# Patient Record
Sex: MALE | Race: WHITE | NOT HISPANIC OR LATINO | Employment: FULL TIME | URBAN - METROPOLITAN AREA
[De-identification: names, ages, dates, MRNs, and addresses within clinical notes are randomized per-mention and may not be internally consistent; named-entity substitution may affect disease eponyms.]

---

## 2018-01-22 ENCOUNTER — HOSPITAL ENCOUNTER (EMERGENCY)
Facility: CLINIC | Age: 43
Discharge: HOME | End: 2018-01-22
Attending: EMERGENCY MEDICINE | Admitting: EMERGENCY MEDICINE
Payer: COMMERCIAL

## 2018-01-22 VITALS
BODY MASS INDEX: 36.7 KG/M2 | RESPIRATION RATE: 18 BRPM | HEART RATE: 85 BPM | DIASTOLIC BLOOD PRESSURE: 90 MMHG | SYSTOLIC BLOOD PRESSURE: 165 MMHG | TEMPERATURE: 98 F | OXYGEN SATURATION: 98 % | WEIGHT: 265 LBS

## 2018-01-22 DIAGNOSIS — K64.4 EXTERNAL HEMORRHOIDS: ICD-10-CM

## 2018-01-22 LAB — HGB BLD-MCNC: 13.9 G/DL (ref 13.3–17.7)

## 2018-01-22 PROCEDURE — 99283 EMERGENCY DEPT VISIT LOW MDM: CPT | Performed by: EMERGENCY MEDICINE

## 2018-01-22 PROCEDURE — 99282 EMERGENCY DEPT VISIT SF MDM: CPT | Mod: Z6 | Performed by: EMERGENCY MEDICINE

## 2018-01-22 PROCEDURE — 85018 HEMOGLOBIN: CPT | Performed by: EMERGENCY MEDICINE

## 2018-01-22 ASSESSMENT — ENCOUNTER SYMPTOMS
FEVER: 0
ABDOMINAL PAIN: 1
ANAL BLEEDING: 1
DIARRHEA: 0

## 2018-01-22 NOTE — ED AVS SNAPSHOT
[facility], [place], Emergency Department    [address] [place] [id number]    Phone:  [phone]                                       Jesus Grady   MRN: [id number]    Department:  [facility], Emergency Department   Date of Visit:  1/22/2018           After Visit Summary Signature Page     I have received my discharge instructions, and my questions have been answered. I have discussed any challenges I see with this plan with the nurse or doctor.    ..........................................................................................................................................  Patient/Patient Representative Signature      ..........................................................................................................................................  Patient Representative Print Name and Relationship to Patient    ..................................................               ................................................  Date                                            Time    ..........................................................................................................................................  Reviewed by Signature/Title    ...................................................              ..............................................  Date                                                            Time

## 2018-01-22 NOTE — ED AVS SNAPSHOT
[facility], Emergency Department    [address] [facility] [address]    Phone:  [phone]                                       Jesus Grady   MRN: [id number]    Department:  [facility], Emergency Department   Date of Visit:  1/22/2018           Patient Information     Date Of Birth          1975        Your diagnoses for this visit were:     External hemorrhoids        You were seen by Koby Kenney MD.        Discharge Instructions       Please make an appointment to follow up with Your Primary Care Provider.    Return to the emergency department for any problems.    Discharge References/Attachments     HEMORRHOIDS (ENGLISH)      24 Hour Appointment Hotline       To make an appointment at any [facility] clinic, call [phone] ([phone]). If you don't have a family doctor or clinic, we will help you find one. [facility] clinics are conveniently located to serve the needs of you and your family.             Review of your medicines      Our records show that you are taking the medicines listed below. If these are incorrect, please call your family doctor or clinic.        Dose / Directions Last dose taken    Multi-vitamin Tabs tablet   Dose:  1 tablet   Quantity:  100 tablet        Take 1 tablet by mouth daily.   Refills:  3                Procedures and tests performed during your visit     Hemoglobin      Orders Needing Specimen Collection     None      Pending Results     No orders found from 1/20/2018 to 1/23/2018.            Pending Culture Results     No orders found from 1/20/2018 to 1/23/2018.            Pending Results Instructions     If you had any lab results that were not finalized at the time of your Discharge, you can call the ED Lab Result RN at [phone]. You will be contacted by this team for any positive Lab results or changes in treatment. The nurses are available 7 days a week from 10A to 6:30P.  You can leave a message 24 hours per day and they will  "return your call.        Thank you for choosing [facility]       Thank you for choosing [facility] for your care. Our goal is always to provide you with excellent care. Hearing back from our patients is one way we can continue to improve our services. Please take a few minutes to complete the written survey that you may receive in the mail after you visit with us. Thank you!        The [employer] Information     [employer] lets you send messages to your doctor, view your test results, renew your prescriptions, schedule appointments and more. To sign up, go to [email or web address] . Click on \"Log in\" on the left side of the screen, which will take you to the Welcome page. Then click on \"Sign up Now\" on the right side of the page.     You will be asked to enter the access code listed below, as well as some personal information. Please follow the directions to create your username and password.     Your access code is: [email or web address]  Expires: 2018  9:13 PM     Your access code will  in 90 days. If you need help or a new code, please call your [facility] clinic or [phone].        Care EveryWhere ID     This is your Care EveryWhere ID. This could be used by other organizations to access your [facility] medical records  [id number]        Equal Access to Services     TONY LAN : Quinn beckwitho Solibertad, waaxda luqadaha, qaybta kaalmada adeegyada, jose a avila. So [facility] [phone].    ATENCIÓN: Si habla español, tiene a sal disposición servicios gratuitos de asistencia lingüística. Llame al [phone].    We comply with applicable federal civil rights laws and [place] laws. We do not discriminate on the basis of race, color, national origin, age, disability, sex, sexual orientation, or gender identity.            After Visit Summary       This is your record. Keep this with you and show to your community pharmacist(s) and doctor(s) at your next visit.                  "

## 2018-01-23 NOTE — ED PROVIDER NOTES
"    [place] EMERGENCY DEPARTMENT ([facility])  1/22/18   History     Chief Complaint   Patient presents with     Rectal Bleeding     HPI  Jesus Grady is a 42 year old male with a medical history significant for diverticulitis who presents to the Emergency Department for evaluation of rectal bleeding.  Patient reports that he had the feeling he needed to have a bowel movement, and when he went to pass the stool \"only blood came out\".  Patient is unsure of the amount of blood, but he notes that it turned the toilet bowl red.  Patient reports that he had a bowel movement earlier in the day to this, which was normal.  Patient reports that he has a history of diverticulitis and has flareups of this occasionally.  He states that he had a flareup of this, started started having some abdominal discomfort on 1/20/2018.  He states that this pain is improved, but is still present currently.  He denies any fevers or diarrhea today.  But he does note that he was having diarrhea on 1/20/2018.  He denies any feeling of masses or swelling in the rectal area.  Patient did take some aspirin today, but is not on any anticoagulation medications daily.  Patient has never had a colonoscopy in the past.    I have reviewed the Medications, Allergies, Past Medical and Surgical History, and Social History in the [employer] system.    Past Medical History:   Diagnosis Date     Carpal tunnel syndrome 3/04    Right     Diverticulitis of colon (without mention of hemorrhage)(562.11) 9/04       Past Surgical History:   Procedure Laterality Date     NO HISTORY OF SURGERY         No family history on file.    Social History   Substance Use Topics     Smoking status: Never Smoker     Smokeless tobacco: Never Used      Comment: grew up in a family of smokers     Alcohol use Yes      Comment: occ, about 6 beers weekly       No current facility-administered medications for this encounter.      Current Outpatient Prescriptions   Medication     " multivitamin, therapeutic with minerals (MULTI-VITAMIN) TABS        Allergies   Allergen Reactions     No Known Drug Allergies          Review of Systems   Constitutional: Negative for fever.   Gastrointestinal: Positive for abdominal pain and anal bleeding. Negative for diarrhea.   All other systems reviewed and are negative.      Physical Exam     ED Triage Vitals   Enc Vitals Group      BP 01/22/18 2037 163/104      Pulse 01/22/18 2037 81      Resp 01/22/18 2037 18      Temp 01/22/18 2037 98  F (36.7  C)      Temp src 01/22/18 2037 Oral      SpO2 01/22/18 2037 98 %      Weight 01/22/18 2037 120.2 kg (265 lb)       Physical Exam   Constitutional: He is oriented to person, place, and time. Vital signs are normal. He appears well-developed and well-nourished.  Non-toxic appearance. He does not appear ill. No distress.   HENT:   Head: Normocephalic and atraumatic.   Mouth/Throat: Oropharynx is clear and moist. No oropharyngeal exudate.   Eyes: Conjunctivae and EOM are normal. Pupils are equal, round, and reactive to light. No scleral icterus.   Neck: Normal range of motion. Neck supple. No JVD present. No tracheal deviation present. No thyromegaly present.   Cardiovascular: Normal rate, regular rhythm, normal heart sounds and intact distal pulses.  Exam reveals no gallop and no friction rub.    No murmur heard.  Pulmonary/Chest: Effort normal and breath sounds normal. No respiratory distress.   Abdominal: Soft. Bowel sounds are normal. He exhibits no distension and no mass. There is no tenderness.   Genitourinary: Rectal exam shows external hemorrhoid ( Stigmata of recent bleed noted, no evidence of active bleeding.). Rectal exam shows no fissure.   Musculoskeletal: Normal range of motion. He exhibits no edema or tenderness.   Lymphadenopathy:     He has no cervical adenopathy.   Neurological: He is alert and oriented to person, place, and time. He has normal strength. No cranial nerve deficit or sensory deficit.    Skin: Skin is warm and dry. No rash noted. No erythema. No pallor.   Psychiatric: He has a normal mood and affect. His behavior is normal.   Nursing note and vitals reviewed.      ED Course   8:36 PM  The patient was seen and examined by Koby Kenney MD in Room ED19.     ED Course     Procedures        Results for orders placed or performed during the hospital encounter of 01/22/18   Hemoglobin   Result Value Ref Range    Hemoglobin 13.9 13.3 - 17.7 g/dL            Assessments & Plan (with Medical Decision Making)   Patient presented Emergency Department complaining of some bright red blood in the toilet when he sat down and tried to have a bowel movement.  He appears in no acute distress and vital signs are within normal limits.  Exam demonstrates an external hemorrhoid, and there is some stigmata of recent bleeding.  Hemoglobin is normal and not significantly changed from previous value.  I suspect the bleeding has come from the external hemorrhoid which may have been brought about by his recent diarrhea and  multiple bowel movements over the weekend.  At this point in time, I'm comfortable discharging him and recommend he follow-up with his primary care clinic to see if he needs a referral for colonoscopy given his age and this episode of bleeding.  He was discharged with instructions for care and follow-up in good condition.    This part of the medical record was transcribed by Cliff Camacho Medical Scribe, from a dictation done by Koby Kenney MD.       I have reviewed the nursing notes.    I have reviewed the findings, diagnosis, plan and need for follow up with the patient.    Discharge Medication List as of 1/22/2018  9:18 PM          Final diagnoses:   External hemorrhoids     ILionel, am serving as a trained medical scribe to document services personally performed by Ambrose Kenney MD, based on the provider's statements to me.   I, Ambrose Kenney, was physically present and have reviewed  and verified the accuracy of this note documented by Lionel Mesa.    1/22/2018   [facility], [place], EMERGENCY DEPARTMENT complaining of some bright red blood in the toilet when that he said that it had a bowel movement.  He appears in no acute distress and vital signs are within normal limits.  Exam demonstrates an external hemorrhoid and there is some stigmata of recent bleeding.  Hemoglobin is normal and not significantly changed from previous value.  I suspect the bleeding is coming from the external hemorrhoid which may have been brought about by his recent diarrhea and by multiple bowel movements over the weekend.  At this point time, we will discharge him and recommend he follow-up with his primary care clinic to see if he needs to have referral for colonoscopy given his age and this episode of bleeding.  He was discharged with instructions for care and follow-up in good condition.     Koby Kenney MD  01/24/18 0977

## 2018-01-23 NOTE — DISCHARGE INSTRUCTIONS
Please make an appointment to follow up with Your Primary Care Provider.    Return to the emergency department for any problems.

## 2018-01-23 NOTE — ED NOTES
Patient presents ambulatory to ED with complaints of bright red blood in his stool. Patient has history of diverticulosis.

## 2019-07-02 ENCOUNTER — OFFICE VISIT (OUTPATIENT)
Dept: FAMILY MEDICINE | Facility: CLINIC | Age: 44
End: 2019-07-02
Payer: COMMERCIAL

## 2019-07-02 VITALS
HEART RATE: 72 BPM | HEIGHT: 72 IN | RESPIRATION RATE: 16 BRPM | SYSTOLIC BLOOD PRESSURE: 130 MMHG | OXYGEN SATURATION: 96 % | DIASTOLIC BLOOD PRESSURE: 82 MMHG | WEIGHT: 273.75 LBS | BODY MASS INDEX: 37.08 KG/M2 | TEMPERATURE: 98.2 F

## 2019-07-02 DIAGNOSIS — Z00.00 ROUTINE GENERAL MEDICAL EXAMINATION AT A HEALTH CARE FACILITY: Primary | ICD-10-CM

## 2019-07-02 DIAGNOSIS — L98.9 SKIN LESION: ICD-10-CM

## 2019-07-02 DIAGNOSIS — M25.552 HIP PAIN, LEFT: ICD-10-CM

## 2019-07-02 DIAGNOSIS — M25.512 ACUTE PAIN OF LEFT SHOULDER: ICD-10-CM

## 2019-07-02 DIAGNOSIS — Z13.6 SCREENING FOR CARDIOVASCULAR CONDITION: ICD-10-CM

## 2019-07-02 DIAGNOSIS — Z13.1 SCREENING FOR DIABETES MELLITUS: ICD-10-CM

## 2019-07-02 LAB
CHOLEST SERPL-MCNC: 219 MG/DL
GLUCOSE SERPL-MCNC: 79 MG/DL (ref 70–99)
HDLC SERPL-MCNC: 46 MG/DL
LDLC SERPL CALC-MCNC: 142 MG/DL
NONHDLC SERPL-MCNC: 173 MG/DL
TRIGL SERPL-MCNC: 154 MG/DL

## 2019-07-02 PROCEDURE — 99213 OFFICE O/P EST LOW 20 MIN: CPT | Mod: 25 | Performed by: FAMILY MEDICINE

## 2019-07-02 PROCEDURE — 82947 ASSAY GLUCOSE BLOOD QUANT: CPT | Performed by: FAMILY MEDICINE

## 2019-07-02 PROCEDURE — 99386 PREV VISIT NEW AGE 40-64: CPT | Performed by: FAMILY MEDICINE

## 2019-07-02 PROCEDURE — 80061 LIPID PANEL: CPT | Performed by: FAMILY MEDICINE

## 2019-07-02 PROCEDURE — 36415 COLL VENOUS BLD VENIPUNCTURE: CPT | Performed by: FAMILY MEDICINE

## 2019-07-02 ASSESSMENT — ENCOUNTER SYMPTOMS
CHILLS: 0
HEMATURIA: 0
COUGH: 0
CONSTIPATION: 0
JOINT SWELLING: 0
DYSURIA: 0
FEVER: 0
WEAKNESS: 0
PARESTHESIAS: 1
SHORTNESS OF BREATH: 0
HEADACHES: 0
FREQUENCY: 0
NERVOUS/ANXIOUS: 0
NAUSEA: 0
HEMATOCHEZIA: 0
DIARRHEA: 0
PALPITATIONS: 0
ARTHRALGIAS: 1
HEARTBURN: 0
DIZZINESS: 0
SORE THROAT: 0
ABDOMINAL PAIN: 0
MYALGIAS: 1
EYE PAIN: 0

## 2019-07-02 ASSESSMENT — MIFFLIN-ST. JEOR: SCORE: 2164.72

## 2019-07-02 NOTE — PROGRESS NOTES
SUBJECTIVE:   CC: Jesus Grady is an 43 year old male who presents for preventative health visit.     Healthy Habits:     Getting at least 3 servings of Calcium per day:  Yes    Bi-annual eye exam:  Yes    Dental care twice a year:  NO    Sleep apnea or symptoms of sleep apnea:  None    Diet:  Regular (no restrictions)    Frequency of exercise:  1 day/week    Duration of exercise:  30-45 minutes    Taking medications regularly:  Not Applicable    PHQ-2 Total Score: 0    Additional concerns today:  Yes  Musculoskeletal Problem   Associated symptoms include arthralgias, chest pain and myalgias. Pertinent negatives include no abdominal pain, chills, congestion, coughing, fever, headaches, joint swelling, nausea, rash, sore throat or weakness.     Musculoskeletal problem/pain      Duration:  1 year     Description  Location:  Left shoulder     Intensity:  moderate    Accompanying signs and symptoms: radiation of pain to elbow, when lifting arm above shoulder pt state there are some numbness to the finger, fingers are numb when sleeping due to sleep position       History  Previous similar problem: no   Previous evaluation:  none    Precipitating or alleviating factors:  Trauma or overuse: YES-  Other work  Aggravating factors include: none    Therapies tried and outcome: stretching     Musculoskeletal problem/pain      Duration:  6-8 months     Description  Location: left hip/ groin      Intensity:  More of sharp pain     Accompanying signs and symptoms: none    History  Previous similar problem: no   Previous evaluation:  none    Precipitating or alleviating factors:  Trauma or overuse: YES- work, leg was stuck in mud, did feel the joints move   Aggravating factors include:  Notice in the morning.     Therapies tried and outcome: nothing     Medical assistant advised patient about addressing additional health concerns that could be billed, as it is not considered a part of a preventive physical. Patient verbalized  understanding.    Paramjit Jacobs MA on 7/2/2019 at 7:37 AM  Today's PHQ-2 Score:   PHQ-2 ( 1999 [employer]) 7/2/2019   Q1: Little interest or pleasure in doing things 0   Q2: Feeling down, depressed or hopeless 0   PHQ-2 Score 0   Q1: Little interest or pleasure in doing things Not at all   Q2: Feeling down, depressed or hopeless Not at all   PHQ-2 Score 0     Abuse: Current or Past(Physical, Sexual or Emotional)- No  Do you feel safe in your environment? Yes    Social History     Tobacco Use     Smoking status: Never Smoker     Smokeless tobacco: Never Used     Tobacco comment: grew up in a family of smokers   Substance Use Topics     Alcohol use: Yes     Comment: occ, about 6 beers weekly     If you drink alcohol do you typically have >3 drinks per day or >7 drinks per week? Yes    Alcohol Use 7/2/2019   Prescreen: >3 drinks/day or >7 drinks/week? Yes   Prescreen: >3 drinks/day or >7 drinks/week? -   AUDIT SCORE  4     AUDIT - Alcohol Use Disorders Identification Test - Reproduced from the World Health Organization Audit 2001 (Second Edition) 7/2/2019   1.  How often do you have a drink containing alcohol? 2 to 3 times a week   2.  How many drinks containing alcohol do you have on a typical day when you are drinking? 1 or 2   3.  How often do you have five or more drinks on one occasion? Less than monthly   4.  How often during the last year have you found that you were not able to stop drinking once you had started? Never   5.  How often during the last year have you failed to do what was normally expected of you because of drinking? Never   6.  How often during the last year have you needed a first drink in the morning to get yourself going after a heavy drinking session? Never   7.  How often during the last year have you had a feeling of guilt or remorse after drinking? Never   8.  How often during the last year have you been unable to remember what happened the night before because of your drinking? Never   9.  Have  you or someone else been injured because of your drinking? No   10. Has a relative, friend, doctor or other health care worker been concerned about your drinking or suggested you cut down? No   TOTAL SCORE 4     Last PSA: No results found for: PSA    Reviewed orders with patient. 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     Lives with his wife and a child.     No previous hospitalization or surgery. Had a broken arm in high school.     Left shoulder - only with not using it. Pain on top of shoulder and some ache feelings. Some tightness of muscles. If he stretches out and it helps.   Right handed. For work needs to do lots of shoveling above head.   No tingling or numbness of fingertips but sometime sleeping with crossed arm gives him numbness.   No chest pain or tightness.     Left hip - feels deep inside the hip. On front side. Sometime outside edge. Generally notices when he gets up in the morning.   Improves after few steps. Sometime at work notices with very large step. No radiation of pain.     Review of Systems   Constitutional: Negative for chills and fever.   HENT: Negative for congestion, ear pain, hearing loss and sore throat.    Eyes: Positive for visual disturbance. Negative for pain.   Respiratory: Negative for cough and shortness of breath.    Cardiovascular: Positive for chest pain. Negative for palpitations and peripheral edema.   Gastrointestinal: Negative for abdominal pain, constipation, diarrhea, heartburn, hematochezia and nausea.   Genitourinary: Negative for discharge, dysuria, frequency, genital sores, hematuria, impotence and urgency.   Musculoskeletal: Positive for arthralgias and myalgias. Negative for  joint swelling.   Skin: Negative for rash.   Neurological: Positive for paresthesias. Negative for dizziness, weakness and headaches.   Psychiatric/Behavioral: Negative for mood changes. The patient is not nervous/anxious.        OBJECTIVE:   /82 (BP Location: Right arm, Patient Position: Chair, Cuff Size: Adult Large)   Pulse 72   Temp 98.2  F (36.8  C) (Oral)   Resp 16   Ht 1.829 m (6')   Wt 124.2 kg (273 lb 12 oz)   SpO2 96%   BMI 37.13 kg/m      Physical Exam  GENERAL: healthy, alert and no distress  EYES: Eyes grossly normal to inspection, PERRL and conjunctivae and sclerae normal  HENT: ear canals and TM's normal, nose and mouth without ulcers or lesions  NECK: no adenopathy, no asymmetry, masses, or scars and thyroid normal to palpation  RESP: lungs clear to auscultation - no rales, rhonchi or wheezes  CV: regular rate and rhythm, normal S1 S2, no S3 or S4, no murmur, click or rub, no peripheral edema and peripheral pulses strong  ABDOMEN: soft, nontender, no hepatosplenomegaly, no masses and bowel sounds normal   (male): normal male genitalia without lesions or urethral discharge, no hernia  MS: Left shoulder examined.  No visible deformity.  Range of motion is minimally restricted above head.  , strength, reflexes are normal and symmetrical.  Empty can sign negative.  Mild diffuse deltoid area tenderness.  Left hip examined -left trochanteric bursa tenderness.  Range of motion of hip is not restricted.  SKIN: no suspicious lesions or rashes,  right anterior deltoid area around 2 cm x 3 cm soft tissue swelling present, nontender, freely mobile  NEURO: Normal strength and tone, mentation intact and speech normal  PSYCH: mentation appears normal, affect normal/bright    ASSESSMENT/PLAN:   1. Routine general medical examination at a health care facility       2. Screening for diabetes mellitus     - GLUCOSE    3. Screening for cardiovascular condition     - Lipid panel reflex to direct LDL  Non-fasting    4. Acute pain of left shoulder  I suspect most likely overuse injury.  Discussed physical therapy for strengthening exercises and if that fails follow-up with the sports medicine.  Exam is fairly benign.  - [facility] CH[facility]RACT[facility] REFERRAL; Future    5. Hip pain, left  Trochanteric bursitis most likely.  Discussed home care and physical therapy.  If it fails -sports medicine referral.  - [facility] HAND, [facility] CHIR[facility]RACTIC REFERRAL; Future    6. Skin lesion  On right anterior deltoid area.  Most likely lipoma.  He is asymptomatic and would like to hold off on any intervention.      COUNSELING:   Reviewed preventive health counseling, as reflected in patient instructions  Special attention given to:        Regular exercise       Healthy diet/nutrition       Vision screening       Hearing screening    Estimated body mass index is 37.13 kg/m  as calculated from the following:    Height as of this encounter: 1.829 m (6').    Weight as of this encounter: 124.2 kg (273 lb 12 oz).     Weight management plan: Discussed healthy diet and exercise guidelines     reports that he has never smoked. He has never used smokeless tobacco.      Counseling Resources:  ATP IV Guidelines  Pooled Cohorts Equation Calculator  FRAX Risk Assessment  ICSI Preventive Guidelines  Dietary Guidelines for Americans, 2010  USDA's MyPlate  ASA Prophylaxis  Lung CA Screening    Gerard Tsang MD, MD  [facility] [place] [facility] [place]

## 2019-07-15 ENCOUNTER — THERAPY VISIT (OUTPATIENT)
Dept: PHYSICAL THERAPY | Facility: CLINIC | Age: 44
End: 2019-07-15
Payer: COMMERCIAL

## 2019-07-15 DIAGNOSIS — M25.512 ACUTE PAIN OF LEFT SHOULDER: ICD-10-CM

## 2019-07-15 DIAGNOSIS — M25.512 LEFT SHOULDER PAIN: ICD-10-CM

## 2019-07-15 DIAGNOSIS — M25.552 HIP PAIN, LEFT: ICD-10-CM

## 2019-07-15 PROCEDURE — 97161 PT EVAL LOW COMPLEX 20 MIN: CPT | Mod: GP | Performed by: PHYSICAL THERAPIST

## 2019-07-15 PROCEDURE — 97110 THERAPEUTIC EXERCISES: CPT | Mod: GP | Performed by: PHYSICAL THERAPIST

## 2019-07-15 ASSESSMENT — ACTIVITIES OF DAILY LIVING (ADL)
ROLLING_OVER_IN_BED: NO DIFFICULTY AT ALL
HOS_ADL_COUNT: 17
HEAVY_WORK: NO DIFFICULTY AT ALL
WALKING_DOWN_STEEP_HILLS: NO DIFFICULTY AT ALL
LIGHT_TO_MODERATE_WORK: NO DIFFICULTY AT ALL
TWISTING/PIVOTING_ON_INVOLVED_LEG: NO DIFFICULTY AT ALL
GETTING_INTO_AND_OUT_OF_AN_AVERAGE_CAR: NO DIFFICULTY AT ALL
WALKING_APPROXIMATELY_10_MINUTES: NO DIFFICULTY AT ALL
WALKING_INITIALLY: NO DIFFICULTY AT ALL
RECREATIONAL_ACTIVITIES: NO DIFFICULTY AT ALL
HOS_ADL_ITEM_SCORE_TOTAL: 68
WALKING_UP_STEEP_HILLS: NO DIFFICULTY AT ALL
HOS_ADL_HIGHEST_POTENTIAL_SCORE: 68
GOING_UP_1_FLIGHT_OF_STAIRS: NO DIFFICULTY AT ALL
STANDING_FOR_15_MINUTES: NO DIFFICULTY AT ALL
DEEP_SQUATTING: NO DIFFICULTY AT ALL
WALKING_15_MINUTES_OR_GREATER: NO DIFFICULTY AT ALL
PUTTING_ON_SOCKS_AND_SHOES: NO DIFFICULTY AT ALL
STEPPING_UP_AND_DOWN_CURBS: NO DIFFICULTY AT ALL
SITTING_FOR_15_MINUTES: NO DIFFICULTY AT ALL
HOS_ADL_SCORE(%): 100
HOW_WOULD_YOU_RATE_YOUR_CURRENT_LEVEL_OF_FUNCTION_DURING_YOUR_USUAL_ACTIVITIES_OF_DAILY_LIVING_FROM_0_TO_100_WITH_100_BEING_YOUR_LEVEL_OF_FUNCTION_PRIOR_TO_YOUR_HIP_PROBLEM_AND_0_BEING_THE_INABILITY_TO_PERFORM_ANY_OF_YOUR_USUAL_DAILY_ACTIVITIES?: 98
GETTING_INTO_AND_OUT_OF_A_BATHTUB: NO DIFFICULTY AT ALL
GOING_DOWN_1_FLIGHT_OF_STAIRS: NO DIFFICULTY AT ALL

## 2019-07-15 NOTE — PROGRESS NOTES
[facility] Initial Evaluation  Subjective:  The history is provided by the patient. No  was used.   Type of problem:  Left shoulder    This is a chronic condition   Problem details: Pt reports L shoulder pain since 12-14 months, pain is mostly over the weekend when he doesn't use the arm; pain gets worse by the end of the day; stretching helps; MD recommended PT on 7/2/19.   Patient reports pain:  Lateral. Radiates to:  Upper arm and shoulder. Associated symptoms:  Loss of motion/stiffness. Symptoms are exacerbated by rest and relieved by activity/movement.    Type of problem:  Left hip    This is a chronic condition   Problem details: Pt reports L hip pain since 12 months, pain only early in the morning, improves after moving for 10-15 mins; doesn't restrict his function during the day  .   Patient reports pain:  Greater trochanter and groin. Radiates to:  Hip.  Symptoms are exacerbated by lying on extremity       and reported as 6/10 (shoulder dull ache, 6/10 - hip sharp) on pain scale. General health as reported by patient is good. Pertinent medical history includes:  Overweight.           Pain is described as aching and sharp  Pain is worse in the P.M.. Since onset symptoms are unchanged.      Patient is labourer/  . Restrictions include:  Working in normal job without restrictions.                            Objective:  System         Lumbar/SI Evaluation  ROM:  AROM Lumbar: normal                             Cervical/Thoracic Evaluation  Cervical AROM: normal                                  Shoulder Evaluation:  ROM:  AROM:    Flexion:  Left:  Wnl    Right:  Wnl  Extension: Left: wnlRight: wnl  Abduction:  Left: wnl painful arc with eccentric lowering   Right:  Wnl    Internal Rotation:  Left:  T8    Right:  T8      Horizontal Adduction:  Left:  Wnl    Right:  Wnl                  Strength:    Flexion: Left:5/5   Pain:    Right: 5/5     Pain:   Extension:   Left: 5/5    Pain:    Right: 5/5    Pain:  Abduction:  Left: 5/5  Pain:    Right: 5/5     Pain:      External Rotation:   Left:5/5     Pain:   Right:5/5     Pain:                                          Hip Evaluation  HIP AROM:    Flexion: Left: wnl    Right:  Wnl    Extension: Left: wnl    Right:  Wnl  Abduction: Left:  Wnl    Right:  Wnl                Hip Strength:    Flexion:   Left: 4+/5   Pain:  Right: 5/5   Pain:                    Extension:  Left: 4+/5  Pain:Right: 5/5    Pain:    Abduction:  Left: 5-/5     Pain:Right: 5/5    Pain:      External Rotation:  Left: 5/5   Pain:  Right: 5/5   Pain:            Hip Special Testing:      Left hip negative for the following special tests:  Katlin (tightness present ) or Fadir/Labrum  Right hip negative for the following special tests:  Katlin or Fadir/Labrum    Hip Palpation:  Normal       Left hip tenderness not present at:  Greater Trachanter or IT Band    Right hip tenderness not present at:  Greater Trachanter or IT Band             General     ROS    Assessment/Plan:    Patient is a 43 year old male with left side shoulder and left side hip complaints.    Patient has the following significant findings with corresponding treatment plan.                Diagnosis 1:  L shoulder pain, L hip pain   Pain -  self management, education and directional preference exercise  Decreased strength - therapeutic exercise, therapeutic activities and home program    Therapy Evaluation Codes:   1) History comprised of:   Personal factors that impact the plan of care:      None.    Comorbidity factors that impact the plan of care are:      None.     Medications impacting care: None.  2) Examination of Body Systems comprised of:   Body structures and functions that impact the plan of care:      Hip and Shoulder.   Activity limitations that impact the plan of care are:      Sleeping.  3) Clinical presentation characteristics are:   Stable/Uncomplicated.  4) Decision-Making    Low  complexity using standardized patient assessment instrument and/or measureable assessment of functional outcome.  Cumulative Therapy Evaluation is: Low complexity.    Previous and current functional limitations:  (See Goal Flow Sheet for this information)    Short term and Long term goals: (See Goal Flow Sheet for this information)     Communication ability:  Patient appears to be able to clearly communicate and understand verbal and written communication and follow directions correctly.  Treatment Explanation - The following has been discussed with the patient:   RX ordered/plan of care  Anticipated outcomes  Possible risks and side effects  This patient would benefit from PT intervention to resume normal activities.   Rehab potential is excellent.    Frequency:  2 X a month, once daily  Duration:  for 2 months  Discharge Plan:  Achieve all LTG.  Independent in home treatment program.  Return to previous functional level by discharge.  Reach maximal therapeutic benefit.    Please refer to the daily flowsheet for treatment today, total treatment time and time spent performing 1:1 timed codes.

## 2019-09-25 PROBLEM — M25.512 LEFT SHOULDER PAIN: Status: RESOLVED | Noted: 2019-07-15 | Resolved: 2019-09-25

## 2019-09-25 PROBLEM — M25.552 HIP PAIN, LEFT: Status: RESOLVED | Noted: 2019-07-15 | Resolved: 2019-09-25

## 2019-10-25 ENCOUNTER — OFFICE VISIT (OUTPATIENT)
Dept: URGENT CARE | Facility: URGENT CARE | Age: 44
End: 2019-10-25
Payer: OTHER MISCELLANEOUS

## 2019-10-25 VITALS
SYSTOLIC BLOOD PRESSURE: 140 MMHG | TEMPERATURE: 98.1 F | BODY MASS INDEX: 33.72 KG/M2 | WEIGHT: 249 LBS | DIASTOLIC BLOOD PRESSURE: 84 MMHG | HEIGHT: 72 IN | HEART RATE: 60 BPM | RESPIRATION RATE: 16 BRPM

## 2019-10-25 DIAGNOSIS — M54.50 ACUTE BILATERAL LOW BACK PAIN WITHOUT SCIATICA: Primary | ICD-10-CM

## 2019-10-25 PROCEDURE — 99213 OFFICE O/P EST LOW 20 MIN: CPT | Performed by: FAMILY MEDICINE

## 2019-10-25 RX ORDER — CYCLOBENZAPRINE HCL 10 MG
10 TABLET ORAL 3 TIMES DAILY PRN
Qty: 50 TABLET | Refills: 1 | Status: SHIPPED | OUTPATIENT
Start: 2019-10-25

## 2019-10-25 ASSESSMENT — MIFFLIN-ST. JEOR: SCORE: 2052.46

## 2019-10-25 NOTE — LETTER
To Whom It MayConcern:       Jesus Grady  was seen in our clinic on 10/25/2019        Patient may return to work on   Uncertain, may be able to do light duty .                            Restrictions: decrease lifting til feeling better    Comments:          Provider Signature:         SCHUYLER Ceballos MD

## 2019-10-25 NOTE — PROGRESS NOTES
Subjective: 3 nights ago on Tuesday afternoon at his work where he is a  he was digging a hole and when he got out he could not straighten up very easily, hurt a lot worse when he got home after driving for 20 minutes, if he sits and tries to get up he cannot straighten out because of pain and he has to move really slowly.  They had him on light duty and he has the weekend off but is supposed to work again on Monday.  He has never hurt his back before.  Bowel and bladder are fine.  It does not really radiate    Objective: Moves slowly.  DTRs are normal and symmetric.  Muscle strength is normal and symmetric.  No pain on palpation.  Straight leg test is negative    Assessment and plan: Low back strain, primarily muscular.  He is taking Aleve and I told him to go up to the maximum and add Tylenol and I sent a prescription for muscle relaxer as well.  I wrote a note for work.  This is a work-related injury and he has reported it to them.  It is difficult to know how long this will last and as long as they are willing to help him with light duty he may be able to get back to full work in the next few weeks.

## 2019-11-05 ENCOUNTER — HEALTH MAINTENANCE LETTER (OUTPATIENT)
Age: 44
End: 2019-11-05

## 2020-11-22 ENCOUNTER — HEALTH MAINTENANCE LETTER (OUTPATIENT)
Age: 45
End: 2020-11-22

## 2021-09-19 ENCOUNTER — HEALTH MAINTENANCE LETTER (OUTPATIENT)
Age: 46
End: 2021-09-19

## 2022-01-09 ENCOUNTER — HEALTH MAINTENANCE LETTER (OUTPATIENT)
Age: 47
End: 2022-01-09

## 2022-11-20 ENCOUNTER — HEALTH MAINTENANCE LETTER (OUTPATIENT)
Age: 47
End: 2022-11-20

## 2023-04-15 ENCOUNTER — HEALTH MAINTENANCE LETTER (OUTPATIENT)
Age: 48
End: 2023-04-15

## 2023-10-21 ENCOUNTER — APPOINTMENT (OUTPATIENT)
Dept: MRI IMAGING | Facility: CLINIC | Age: 48
End: 2023-10-21
Attending: EMERGENCY MEDICINE
Payer: OTHER MISCELLANEOUS

## 2023-10-21 ENCOUNTER — HOSPITAL ENCOUNTER (EMERGENCY)
Facility: CLINIC | Age: 48
Discharge: HOME | End: 2023-10-21
Attending: EMERGENCY MEDICINE | Admitting: EMERGENCY MEDICINE
Payer: OTHER MISCELLANEOUS

## 2023-10-21 ENCOUNTER — APPOINTMENT (OUTPATIENT)
Dept: GENERAL RADIOLOGY | Facility: CLINIC | Age: 48
End: 2023-10-21
Attending: EMERGENCY MEDICINE
Payer: OTHER MISCELLANEOUS

## 2023-10-21 VITALS
TEMPERATURE: 97.7 F | RESPIRATION RATE: 18 BRPM | DIASTOLIC BLOOD PRESSURE: 89 MMHG | HEART RATE: 73 BPM | SYSTOLIC BLOOD PRESSURE: 134 MMHG | OXYGEN SATURATION: 97 %

## 2023-10-21 DIAGNOSIS — S33.5XXA LUMBAR BACK SPRAIN, INITIAL ENCOUNTER: ICD-10-CM

## 2023-10-21 LAB
ALBUMIN UR-MCNC: NEGATIVE MG/DL
APPEARANCE UR: CLEAR
BILIRUB UR QL STRIP: NEGATIVE
COLOR UR AUTO: NORMAL
GLUCOSE UR STRIP-MCNC: NEGATIVE MG/DL
HGB UR QL STRIP: NEGATIVE
KETONES UR STRIP-MCNC: NEGATIVE MG/DL
LEUKOCYTE ESTERASE UR QL STRIP: NEGATIVE
NITRATE UR QL: NEGATIVE
PH UR STRIP: 6.5 [PH] (ref 5–7)
RBC URINE: 0 /HPF
SP GR UR STRIP: 1.02 (ref 1–1.03)
UROBILINOGEN UR STRIP-MCNC: NORMAL MG/DL
WBC URINE: <1 /HPF

## 2023-10-21 PROCEDURE — 81001 URINALYSIS AUTO W/SCOPE: CPT | Performed by: EMERGENCY MEDICINE

## 2023-10-21 PROCEDURE — 250N000013 HC RX MED GY IP 250 OP 250 PS 637: Performed by: EMERGENCY MEDICINE

## 2023-10-21 PROCEDURE — 73502 X-RAY EXAM HIP UNI 2-3 VIEWS: CPT | Mod: 26 | Performed by: RADIOLOGY

## 2023-10-21 PROCEDURE — 72148 MRI LUMBAR SPINE W/O DYE: CPT | Mod: 26 | Performed by: STUDENT IN AN ORGANIZED HEALTH CARE EDUCATION/TRAINING PROGRAM

## 2023-10-21 PROCEDURE — 250N000012 HC RX MED GY IP 250 OP 636 PS 637: Performed by: EMERGENCY MEDICINE

## 2023-10-21 PROCEDURE — 99284 EMERGENCY DEPT VISIT MOD MDM: CPT | Mod: 25 | Performed by: EMERGENCY MEDICINE

## 2023-10-21 PROCEDURE — 72148 MRI LUMBAR SPINE W/O DYE: CPT

## 2023-10-21 PROCEDURE — 73502 X-RAY EXAM HIP UNI 2-3 VIEWS: CPT

## 2023-10-21 PROCEDURE — 99284 EMERGENCY DEPT VISIT MOD MDM: CPT | Performed by: EMERGENCY MEDICINE

## 2023-10-21 RX ORDER — DEXAMETHASONE 2 MG/1
6 TABLET ORAL ONCE
Status: COMPLETED | OUTPATIENT
Start: 2023-10-21 | End: 2023-10-21

## 2023-10-21 RX ORDER — CYCLOBENZAPRINE HCL 10 MG
10 TABLET ORAL 3 TIMES DAILY PRN
Qty: 30 TABLET | Refills: 0 | Status: SHIPPED | OUTPATIENT
Start: 2023-10-21 | End: 2023-10-31

## 2023-10-21 RX ORDER — ACETAMINOPHEN 500 MG
1000 TABLET ORAL ONCE
Status: COMPLETED | OUTPATIENT
Start: 2023-10-21 | End: 2023-10-21

## 2023-10-21 RX ORDER — MAGNESIUM HYDROXIDE/ALUMINUM HYDROXICE/SIMETHICONE 120; 1200; 1200 MG/30ML; MG/30ML; MG/30ML
30 SUSPENSION ORAL ONCE
Status: COMPLETED | OUTPATIENT
Start: 2023-10-21 | End: 2023-10-21

## 2023-10-21 RX ORDER — LIDOCAINE 50 MG/G
3 PATCH TOPICAL EVERY 24 HOURS
Qty: 30 PATCH | Refills: 0 | Status: SHIPPED | OUTPATIENT
Start: 2023-10-21 | End: 2023-10-31

## 2023-10-21 RX ORDER — IBUPROFEN 200 MG
400 TABLET ORAL ONCE
Status: COMPLETED | OUTPATIENT
Start: 2023-10-21 | End: 2023-10-21

## 2023-10-21 RX ORDER — IBUPROFEN 200 MG
600 TABLET ORAL 3 TIMES DAILY
Qty: 42 TABLET | Refills: 0 | Status: SHIPPED | OUTPATIENT
Start: 2023-10-21

## 2023-10-21 RX ORDER — ACETAMINOPHEN 500 MG
1000 TABLET ORAL 3 TIMES DAILY
Qty: 60 TABLET | Refills: 0 | Status: SHIPPED | OUTPATIENT
Start: 2023-10-21 | End: 2023-10-31

## 2023-10-21 RX ORDER — DIAZEPAM 5 MG
5 TABLET ORAL ONCE
Status: COMPLETED | OUTPATIENT
Start: 2023-10-21 | End: 2023-10-21

## 2023-10-21 RX ORDER — LIDOCAINE 4 G/G
3 PATCH TOPICAL ONCE
Status: DISCONTINUED | OUTPATIENT
Start: 2023-10-21 | End: 2023-10-21 | Stop reason: HOSPADM

## 2023-10-21 RX ADMIN — DIAZEPAM 5 MG: 5 TABLET ORAL at 12:37

## 2023-10-21 RX ADMIN — DEXAMETHASONE 6 MG: 2 TABLET ORAL at 12:37

## 2023-10-21 RX ADMIN — IBUPROFEN 400 MG: 200 TABLET, FILM COATED ORAL at 12:36

## 2023-10-21 RX ADMIN — ACETAMINOPHEN 1000 MG: 500 TABLET ORAL at 12:36

## 2023-10-21 RX ADMIN — LIDOCAINE 3 PATCH: 4 PATCH TOPICAL at 14:41

## 2023-10-21 RX ADMIN — ALUMINUM HYDROXIDE, MAGNESIUM HYDROXIDE, AND SIMETHICONE 30 ML: 200; 200; 20 SUSPENSION ORAL at 12:35

## 2023-10-21 ASSESSMENT — ACTIVITIES OF DAILY LIVING (ADL)
ADLS_ACUITY_SCORE: 35
ADLS_ACUITY_SCORE: 35

## 2023-10-21 NOTE — DISCHARGE INSTRUCTIONS
Please make an appointment to [facility] with [facility] (phone: [phone]) as soon as possible.  Also, physical therapy provider will call you in order to set up an appointment with them.  You may need a referral directly from primary care in order to receive physical therapy.  Please also notify your employer regarding concern for suspected work related injury attributable to repetitive motion and lifting.

## 2023-10-21 NOTE — ED TRIAGE NOTES
Sudden lower back pain after lifting L leg in shower  Hard to ambulate       Triage Assessment (Adult)       Row Name 10/21/23 1111          Triage Assessment    Airway WDL WDL        Respiratory WDL    Respiratory WDL WDL        Skin Circulation/Temperature WDL    Skin Circulation/Temperature WDL WDL        Cardiac WDL    Cardiac WDL WDL        Peripheral/Neurovascular WDL    Peripheral Neurovascular WDL WDL        Cognitive/Neuro/Behavioral WDL    Cognitive/Neuro/Behavioral WDL WDL

## 2023-10-21 NOTE — ED PROVIDER NOTES
[place] EMERGENCY DEPARTMENT ([facility])    10/21/23       ED PROVIDER NOTE    History     Chief Complaint   Patient presents with    Back Pain     HPI  Jesus Grady is a 47 year old male with past medical history of diverticulitis presenting to the ED for evaluation of lower back pain. Patient reports sudden onset lower back pain that developed while he was showering between 8-9 PM yesterday night. He states that he was lifting his leg in the shower as he normally does, when he felt something shift in his back. Patient felt like he couldn't stand up straight due to the tightness in his back. Patient states that when he noticed the discomfort, he did not want to force himself upright. He describes that he felt like something moved to the left in his lower back but didn't feel a click or pop. He states that he did not hear a sound when he developed the discomfort. Patient describes that he is now leaning to his right side because he feels that it is less painful than sitting up straight. He notes some numbness and a throbbing sensation in his left lower extremity. Patient feels like the discomfort radiates into his leg. He does not complain of any numbness in his groin or inner thigh. His pain exacerbates when he lifts his leg. The patient's wife states that she heard him groan during the night which is unusual for him even when he is in pain. Patient describes that the pain is very sharp when he tries to stand up straight. When he has his knees bent, he states that he feels a dull, throbbing pain. He feels slightly nauseous with this pain. The pain is constant but he feels sharp pains when he tries to lift his leg pain. He can't extend his leg without increased pain. He feels that if he tried to stand up right and walk, he would fall over due to the pain. He reports history of back pain associated with his daily employment but states that he usually takes an alleve and uses a heating pad for around a  week which helps relieve the pain.  He states the current episode of the pain is similar in character and location as his prior chronic work related pain, however most recently the severity of pain is most pronounced.  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     Patient additionally reports that on Tuesday (10/17/23), he slipped out of a truck and hit his right hip. He noticed a bruise develop days later but states that he normally notices bruises 3-4 days after trauma. Patient denies any allergies. The last time he ate was yesterday night. Patient does not take any medications, no anticoagulants.     Past Medical History  Past Medical History:   Diagnosis Date    Carpal tunnel syndrome 3/04    Right    Diverticulitis of colon (without mention of hemorrhage)(562.11) 9/04     Past Surgical History:   Procedure Laterality Date    NO HISTORY OF SURGERY       cyclobenzaprine (FLEXERIL) 10 MG tablet  multivitamin, therapeutic with minerals (MULTI-VITAMIN) TABS      Allergies   Allergen Reactions    No Known Drug Allergy      Family History  Family History   Problem Relation Age of Onset    Alcoholism Brother     Alcoholism Brother      Social History   Social History     Tobacco Use    Smoking status: Never    Smokeless tobacco: Never    Tobacco comments:     grew up in a family of smokers   Substance Use Topics    Alcohol use: Yes     Comment: occ, about 6 beers weekly    Drug use: No      Past medical history, past surgical history, medications, allergies, family history, and social history were reviewed with the patient. No additional pertinent items.          Physical Exam     BP: (!) 161/100  Pulse: 70  Temp: 98  F (36.7  C)  Resp: 18  SpO2: 98 %    Physical Exam  Vitals and nursing note reviewed.   Constitutional:       General: He is not in acute distress.     Appearance: Normal appearance. He is not toxic-appearing.   HENT:      Head: Atraumatic.   Eyes:       General: No scleral icterus.     Conjunctiva/sclera: Conjunctivae normal.   Cardiovascular:      Rate and Rhythm: Normal rate.      Heart sounds: Normal heart sounds.   Pulmonary:      Effort: Pulmonary effort is normal. No respiratory distress.      Breath sounds: Normal breath sounds.   Abdominal:      Palpations: Abdomen is soft.      Tenderness: There is no abdominal tenderness.   Musculoskeletal:         General: No deformity.      Cervical back: Neck supple.   Skin:     General: Skin is warm.   Neurological:      General: No focal deficit present.      Mental Status: He is alert and oriented to person, place, and time.      Motor: No weakness.      Coordination: Coordination normal.      Gait: Gait abnormal.      Comments: Antalgic gait             ED Course, Procedures, & Data        Procedures           No results found for any visits on 10/21/23.  Medications - No data to display  Labs Ordered and Resulted from Time of ED Arrival to Time of ED Departure - No data to display  No orders to display          Critical care was not performed.     Medical Decision Making  The patient's presentation was of high complexity (a chronic illness severe exacerbation, progression, or side effect of treatment).    The patient's evaluation involved:  ordering and/or review of 3+ test(s) in this encounter (see separate area of note for details)    The patient's management necessitated moderate risk (prescription drug management including medications given in the ED).    Assessment & Plan      47 year old male with past medical history of chronic back pain and diverticulitis presenting to the ED for evaluation of exacerbation of left-sided lower back pain.  Patient is afebrile and vitally stable including normal pulse ox at 97% on room air.  Patient given a cocktail of medications including Decadron 6 mg p.o., Valium 5 mg p.o., acetaminophen 1 g p.o., ibuprofen 4 mg p.o. and Lidoderm patch x3 to affected area.  He was also  given Maalox to protect against medication-induced gastritis.  Patient sent to x-ray of the right hip which revealed no acute process.  Patient then sent to MRI of the lumbar spine which also revealed no acute process nor concern for cauda equina syndrome.  Upon repeat evaluation patient's pain only mildly improved, however he is declining any narcotic pain control supplements.  Referral placed to primary [facility] Center to establish care and follow-up on this current episode.   referral also placed for physical therapy and patient given home care instructions on low back pain care.  Patient courage to follow-up through employer regarding occupational health service referral.  Patient expressed verbal standing around anticipatory guidance return precautions to the emergency department.    I have reviewed the nursing notes. I have reviewed the findings, diagnosis, plan and need for follow up with the patient.    New Prescriptions    No medications on file       Final diagnoses:   Lumbar back sprain, initial encounter     I, Cindy Ledezma, am serving as a trained medical scribe to document services personally performed by Felipe Kulkarni MD based on the provider's statements to me on October 21, 2023.  This document has been checked and approved by the attending provider.    I, Felipe Kulkarni MD, was physically present and have reviewed and verified the accuracy of this note documented by Cindy Ledezma medical scribe.      Felipe Kulkarni MD  [facility] EMERGENCY DEPARTMENT  10/21/2023     Felipe Kulkarni MD  10/22/23 1133

## 2023-10-21 NOTE — LETTER
October 21, 2023      To Whom It May Concern:      Jesus Grady was seen in our Emergency Department today, 10/21/23.  I expect his condition to improve over the next 7-10 days.  He may return to work/school when improved and/or cleared by Physical Therapy or his primary care physician.    Sincerely,        Felipe Kulkarni MD

## 2023-10-23 ENCOUNTER — OFFICE VISIT (OUTPATIENT)
Dept: FAMILY MEDICINE | Facility: CLINIC | Age: 48
End: 2023-10-23
Payer: OTHER MISCELLANEOUS

## 2023-10-23 VITALS
SYSTOLIC BLOOD PRESSURE: 145 MMHG | BODY MASS INDEX: 39.21 KG/M2 | HEIGHT: 72 IN | OXYGEN SATURATION: 99 % | TEMPERATURE: 97.5 F | WEIGHT: 289.5 LBS | RESPIRATION RATE: 13 BRPM | DIASTOLIC BLOOD PRESSURE: 94 MMHG | HEART RATE: 76 BPM

## 2023-10-23 DIAGNOSIS — Z11.59 NEED FOR HEPATITIS C SCREENING TEST: ICD-10-CM

## 2023-10-23 DIAGNOSIS — Z13.228 SCREENING FOR ENDOCRINE, NUTRITIONAL, METABOLIC AND IMMUNITY DISORDER: ICD-10-CM

## 2023-10-23 DIAGNOSIS — Z23 NEED FOR VACCINATION: ICD-10-CM

## 2023-10-23 DIAGNOSIS — M62.830 BACK MUSCLE SPASM: Primary | ICD-10-CM

## 2023-10-23 DIAGNOSIS — Z13.0 SCREENING FOR ENDOCRINE, NUTRITIONAL, METABOLIC AND IMMUNITY DISORDER: ICD-10-CM

## 2023-10-23 DIAGNOSIS — Z12.11 SCREEN FOR COLON CANCER: ICD-10-CM

## 2023-10-23 DIAGNOSIS — Z13.29 SCREENING FOR ENDOCRINE, NUTRITIONAL, METABOLIC AND IMMUNITY DISORDER: ICD-10-CM

## 2023-10-23 DIAGNOSIS — Z11.4 SCREENING FOR HIV (HUMAN IMMUNODEFICIENCY VIRUS): ICD-10-CM

## 2023-10-23 DIAGNOSIS — Z12.5 SCREENING FOR PROSTATE CANCER: ICD-10-CM

## 2023-10-23 DIAGNOSIS — Z13.21 SCREENING FOR ENDOCRINE, NUTRITIONAL, METABOLIC AND IMMUNITY DISORDER: ICD-10-CM

## 2023-10-23 PROCEDURE — 90715 TDAP VACCINE 7 YRS/> IM: CPT | Performed by: FAMILY MEDICINE

## 2023-10-23 PROCEDURE — 99203 OFFICE O/P NEW LOW 30 MIN: CPT | Mod: 25 | Performed by: FAMILY MEDICINE

## 2023-10-23 PROCEDURE — 90471 IMMUNIZATION ADMIN: CPT | Performed by: FAMILY MEDICINE

## 2023-10-23 ASSESSMENT — PAIN SCALES - GENERAL: PAINLEVEL: MILD PAIN (3)

## 2023-10-23 NOTE — PROGRESS NOTES
Assessment & Plan   Back muscle spasm  No red flags. No indication for imagine.  - Physical Therapy Referral  - tiZANidine (ZANAFLEX) 4 MG tablet  Dispense: 90 tablet; Refill: 0    Screen for colon cancer  - Colonoscopy Screening  Referral    Screening for HIV (human immunodeficiency virus)  - HIV Antigen Antibody Combo    Need for hepatitis C screening test  - Hepatitis C Screen Reflex to HCV RNA Quant and Genotype    Need for vaccination    Screening for endocrine, nutritional, metabolic and immunity disorder  - Comprehensive metabolic panel  - CBC with Platelets & Differential  - Hemoglobin A1c  - TSH with free T4 reflex  - Lipid panel reflex to direct LDL Fasting    Screening for prostate cancer  - PSA, screen      Review of external notes as documented elsewhere in note       MED REC REQUIRED  Post Medication Reconciliation Status:     BMI:   Estimated body mass index is 39.26 kg/m  as calculated from the following:    Height as of this encounter: 1.829 m (6').    Weight as of this encounter: 131.3 kg (289 lb 8 oz).   MD SCHUYLER HIRSCH [facility]   Jesus is a 47 year old, presenting for the following health issues:  Hospital F/U (Edu follow up.  Back pain 10/21 and left eye injury 9/19. Pain level with back=3.)      10/23/2023    12:51 PM   Additional Questions   Roomed by Kaylene PAYAN   He is physically active. He did not feel he had done anything to trigger the back pain. The muscle relaxer from the ED has helped, but it can still grab him. No red flags, radiation, numbness, tingling or weakness.         Objective    BP (!) 143/90 (BP Location: Left arm, Patient Position: Sitting, Cuff Size: Adult Large)   Pulse 76   Temp 97.5  F (36.4  C) (Tympanic)   Resp 13   Ht 1.829 m (6')   Wt 131.3 kg (289 lb 8 oz)   SpO2 99%   BMI 39.26 kg/m    Body mass index is 39.26 kg/m .  Physical Exam   GENERAL: healthy, alert and no distress  NECK: no adenopathy, no  asymmetry, masses, or scars and thyroid normal to palpation  RESP: lungs clear to auscultation - no rales, rhonchi or wheezes  CV: regular rate and rhythm, normal S1 S2, no S3 or S4, no murmur, click or rub, no peripheral edema and peripheral pulses strong  ABDOMEN: soft, nontender, no hepatosplenomegaly, no masses and bowel sounds normal  MS: no gross musculoskeletal defects noted, no edema  SKIN: no suspicious lesions or rashes  NEURO: Normal strength and tone, mentation intact and speech normal  Comprehensive back pain exam:  No tenderness, Pain limits the following motions: forward flexion, Lower extremity strength functional and equal on both sides, Lower extremity reflexes within normal limits bilaterally, Lower extremity sensation normal and equal on both sides, and Straight leg raise negative bilaterally    Admission on 10/21/2023, Discharged on 10/21/2023   Component Date Value Ref Range Status    Color Urine 10/21/2023 Light Yellow  Colorless, Straw, Light Yellow, Yellow Final    Appearance Urine 10/21/2023 Clear  Clear Final    Glucose Urine 10/21/2023 Negative  Negative mg/dL Final    Bilirubin Urine 10/21/2023 Negative  Negative Final    Ketones Urine 10/21/2023 Negative  Negative mg/dL Final    Specific Gravity Urine 10/21/2023 1.016  1.003 - 1.035 Final    Blood Urine 10/21/2023 Negative  Negative Final    pH Urine 10/21/2023 6.5  5.0 - 7.0 Final    Protein Albumin Urine 10/21/2023 Negative  Negative mg/dL Final    Urobilinogen Urine 10/21/2023 Normal  Normal, 2.0 mg/dL Final    Nitrite Urine 10/21/2023 Negative  Negative Final    Leukocyte Esterase Urine 10/21/2023 Negative  Negative Final    RBC Urine 10/21/2023 0  <=2 /HPF Final    WBC Urine 10/21/2023 <1  <=5 /HPF Final

## 2023-10-23 NOTE — PATIENT INSTRUCTIONS
If you have not heard from the scheduling office for PT within 2 business days, please call [phone] for [facility],

## 2023-10-26 ENCOUNTER — THERAPY VISIT (OUTPATIENT)
Dept: PHYSICAL THERAPY | Facility: CLINIC | Age: 48
End: 2023-10-26
Payer: OTHER MISCELLANEOUS

## 2023-10-26 DIAGNOSIS — M62.830 BACK MUSCLE SPASM: Primary | ICD-10-CM

## 2023-10-26 PROCEDURE — 97161 PT EVAL LOW COMPLEX 20 MIN: CPT | Mod: GP

## 2023-10-26 PROCEDURE — 97110 THERAPEUTIC EXERCISES: CPT | Mod: GP

## 2023-10-26 NOTE — PROGRESS NOTES
PHYSICAL THERAPY EVALUATION  Type of Visit: Evaluation    See electronic medical record for Abuse and Falls Screening details.    Subjective       Presenting condition or subjective complaint:  back pain beginning 10/20/23 when he was lifting his leg, pt taking pain meds currently which has decreased pain but hasn't been able to do much beyond ADLs, has been doing exercises which have been helpful   Date of onset: 08/20/23    Relevant medical history:     Dates & types of surgery:      Prior diagnostic imaging/testing results:     MRI - unremarkable   Prior therapy history for the same diagnosis, illness or injury:     injured in 2019 and few small instances since then       Employment:    pipe layer  Hobbies/Interests:  bird hunting, walking    Patient goals for therapy:  move freely without pain    Pain assessment: Pain present     Objective   LUMBAR SPINE EVALUATION  PAIN: Pain Level at Rest: 2/10  Pain Level with Use: 4/10  Pain Location: lumbar spine  Pain Quality: Aching and Sharp  Pain Frequency: constant or dull ache at rest and sharp with movement  Pain is Worst: daytime or wakes up a few times a night when moving   Pain is Exacerbated By: bending forward, twisting/reaching to side   Pain is Relieved By: heat, otc medications, rest, stretch, and exercises provided by ED  Pain Progression: Improved  INTEGUMENTARY (edema, incisions): WNL  POSTURE: Standing Posture: slight trunk flexion  ROM:  mod decrease ROM globally, limited by pain, with rotation worse to R with pain on L and then worse with SB to L   STRENGTH:  hip flexion 5/5R 4/5L with pain on L, knee flexion/extension 5/5 B but painful flexion on L, ER 4/5R and 3+/5L IR 5/5R, 4/5L  NEURAL TENSION:  sciatic nerve positive for SLR on L, negative R  FLEXIBILITY:  decreased hamstring flexibility   LUMBAR/HIP Special Tests:  Hip ROM: flexion limited and painful in L low back L>R, STEVEN positive for L low back pain, FADIR negative    FUNCTIONAL TESTS: Double  Leg Squat: Anterior knee translation, Knee valgus, Hip internal rotation, Improper use of glutes/hips, and some pain standing up  PALPATION:  pain in erector spinae and QL L>R       Assessment & Plan   CLINICAL IMPRESSIONS  Medical Diagnosis: back spasm    Treatment Diagnosis: low back pain   Impression/Assessment: Patient is a 47 year old male with low back pain  complaints.  The following significant findings have been identified: Pain, Decreased ROM/flexibility, Decreased strength, Impaired muscle performance, Decreased activity tolerance, and Impaired posture. These impairments interfere with their ability to perform self care tasks, work tasks, and recreational activities as compared to previous level of function.     Clinical Decision Making (Complexity):  Clinical Presentation: Stable/Uncomplicated  Clinical Presentation Rationale: based on medical and personal factors listed in PT evaluation  Clinical Decision Making (Complexity): Low complexity    PLAN OF CARE  Treatment Interventions:  Modalities: Cryotherapy, Dry Needling, E-stim, Hot Pack  Interventions: Manual Therapy, Neuromuscular Re-education, Therapeutic Activity, Therapeutic Exercise, Self-Care/Home Management    Long Term Goals     PT Goal 1  Goal Identifier: ambulation  Goal Description: patient will increase ambulation tolerance over level and unlevel surfaces without AD for 30 minutes or 1 mile with pain level at <1/10.  Rationale: to maximize safety and independence with performance of ADLs and functional tasks  Target Date: 01/18/24  PT Goal 2  Goal Identifier: working  Goal Description: patient will demonstrate ROM and strength adequate for performance of job requirements with pain level <2/10.  Rationale: to maximize safety and independence with performance of ADLs and functional tasks  Target Date: 12/07/23  PT Goal 3  Goal Identifier: lifting  Goal Description: weeks patient will demonstrate proper form with repeated lifting maneuver and  use of this lifting maneuver throughout day unrestricted with PL <2/10.  Rationale: to maximize safety and independence with performance of ADLs and functional tasks  Target Date: 01/18/24      Frequency of Treatment: 1x/week for 4 weeks then 1x every 2 weeks for 8 weeks  Duration of Treatment: 12 weeks    Recommended Referrals to Other Professionals: Physical Therapy  Education Assessment:   Learner/Method: Patient    Risks and benefits of evaluation/treatment have been explained.   Patient/Family/caregiver agrees with Plan of Care.     Evaluation Time:     PT Eval, Low Complexity Minutes (37968): 30       Signing Clinician: Katrina Cruz PT

## 2023-11-02 ENCOUNTER — THERAPY VISIT (OUTPATIENT)
Dept: PHYSICAL THERAPY | Facility: CLINIC | Age: 48
End: 2023-11-02
Payer: OTHER MISCELLANEOUS

## 2023-11-02 DIAGNOSIS — M62.830 BACK MUSCLE SPASM: Primary | ICD-10-CM

## 2023-11-02 PROCEDURE — 97110 THERAPEUTIC EXERCISES: CPT | Mod: GP

## 2023-11-02 PROCEDURE — 97530 THERAPEUTIC ACTIVITIES: CPT | Mod: GP

## 2023-11-08 ENCOUNTER — TELEPHONE (OUTPATIENT)
Dept: FAMILY MEDICINE | Facility: CLINIC | Age: 48
End: 2023-11-08
Payer: COMMERCIAL

## 2023-11-08 NOTE — TELEPHONE ENCOUNTER
----- Message from Kayla Cuevas MD sent at 11/8/2023  3:05 PM CST -----  Please put him in my Friday approval spot.  Thank you.  Kayla Cuevas MD

## 2023-11-10 ENCOUNTER — OFFICE VISIT (OUTPATIENT)
Dept: FAMILY MEDICINE | Facility: CLINIC | Age: 48
End: 2023-11-10
Payer: COMMERCIAL

## 2023-11-10 VITALS
HEART RATE: 80 BPM | SYSTOLIC BLOOD PRESSURE: 142 MMHG | TEMPERATURE: 97.8 F | OXYGEN SATURATION: 99 % | HEIGHT: 72 IN | RESPIRATION RATE: 16 BRPM | BODY MASS INDEX: 39.51 KG/M2 | WEIGHT: 291.7 LBS | DIASTOLIC BLOOD PRESSURE: 80 MMHG

## 2023-11-10 DIAGNOSIS — M62.830 BACK MUSCLE SPASM: Primary | ICD-10-CM

## 2023-11-10 PROCEDURE — 99213 OFFICE O/P EST LOW 20 MIN: CPT | Performed by: FAMILY MEDICINE

## 2023-11-10 NOTE — LETTER
November 10, 2023      Jesus Grady  [address] [place] [id number]        To Whom It May Concern:    Jesus Grady was seen in our clinic. He may return to work without restrictions on Monday the 13th..      Sincerely,        HAILEY CARTER MD

## 2023-11-27 ENCOUNTER — THERAPY VISIT (OUTPATIENT)
Dept: PHYSICAL THERAPY | Facility: CLINIC | Age: 48
End: 2023-11-27
Payer: OTHER MISCELLANEOUS

## 2023-11-27 DIAGNOSIS — M62.830 BACK MUSCLE SPASM: Primary | ICD-10-CM

## 2023-11-27 PROCEDURE — 97530 THERAPEUTIC ACTIVITIES: CPT | Mod: GP

## 2023-11-27 PROCEDURE — 97110 THERAPEUTIC EXERCISES: CPT | Mod: GP

## 2023-12-12 PROBLEM — M62.830 BACK MUSCLE SPASM: Status: RESOLVED | Noted: 2023-10-26 | Resolved: 2023-12-12

## 2023-12-12 NOTE — PROGRESS NOTES
11/27/23 0500   Appointment Info   Signing clinician's name / credentials Katrina Cruz, PT, DPT   Total/Authorized Visits E+T   Visits Used 3   Medical Diagnosis back spasm   PT Tx Diagnosis low back pain   Other pertinent information off work for 7-10 days but can be adjusted   Progress Note/Certification   Onset of illness/injury or Date of Surgery 08/20/23   Therapy Frequency 1x/week for 4 weeks then 1x every 2 weeks for 8 weeks   Predicted Duration 12 weeks   Progress Note Due Date 01/18/24   Progress Note Completed Date 10/26/23   PT Goal 1   Goal Identifier ambulation   Goal Description patient will increase ambulation tolerance over level and unlevel surfaces without AD for 30 minutes or 1 mile with pain level at <1/10.   Rationale to maximize safety and independence with performance of ADLs and functional tasks   Goal Progress pt walked his dg for 20-30 minutes painfree   Target Date 01/18/24   Date Met 12/08/23   PT Goal 2   Goal Identifier working   Goal Description patient will demonstrate ROM and strength adequate for performance of job requirements with pain level <2/10.   Rationale to maximize safety and independence with performance of ADLs and functional tasks   Goal Progress pt able to dead lift 115lbs today 2x10 in clinic with 0/10 pain   Target Date 12/07/23   Date Met 11/02/23   PT Goal 3   Goal Identifier lifting   Goal Description weeks patient will demonstrate proper form with repeated lifting maneuver and use of this lifting maneuver throughout day unrestricted with PL <2/10.   Rationale to maximize safety and independence with performance of ADLs and functional tasks   Goal Progress pt able to dead lift 115lbs today 2x10 in clinic with 0/10 pain, will see how return to work goes   Target Date 01/18/24   Date Met 11/27/23   Subjective Report   Subjective Report Pt reports his back is doing well. He hasn't had any problems. Pt reports no aches or pains at work. Pt has been back full  duty. Pt reports he has been more cautious with reaching to the side   Treatment Interventions (PT)   Interventions Therapeutic Procedure/Exercise;Therapeutic Activity   Therapeutic Procedure/Exercise   Therapeutic Procedures: strength, endurance, ROM, flexibillity minutes (56186) 25   Ther Proc 1 standing side bend for obliques and QL   Ther Proc 1 - Details x15 each side with 35lbs weight   Ther Proc 2 lateral raises   Ther Proc 2 - Details x10, 15lbs   PTRx Ther Proc 1 Prone Press Ups   PTRx Ther Proc 1 - Details review, continue 1-2x/day   PTRx Ther Proc 2 Side Plank Modified Knees   PTRx Ther Proc 2 - Details [id number]   PTRx Ther Proc 3 Prone Plank Modified Knees   PTRx Ther Proc 3 - Details [id number]   PTRx Ther Proc 4 Pallof Press   PTRx Ther Proc 4 - Details x10   Skilled Intervention cueing for technique and dosing   Patient Response/Progress pt demonstrated understanding, no pain   PTRx Ther Proc 5 Clamshell with Theraband   PTRx Ther Proc 5 - Details review, continue daily then decrease to 3x/week   PTRx Ther Proc 6 Side Stepping With Theraband   PTRx Ther Proc 6 - Details review, continue daily then decrease to 3x/week   PTRx Ther Proc 7 Squat   PTRx Ther Proc 7 - Details review, continue daily then decrease to 3x/week   PTRx Ther Proc 8 Seated Hamstring Stretch   PTRx Ther Proc 8 - Details x30sec   PTRx Ther Proc 9 Standing Hip Flexor Stretch   PTRx Ther Proc 9 - Details x30sec   PTRx Ther Proc 10 Standing Gastroc Stretch   PTRx Ther Proc 10 - Details x30sec   PTRx Ther Proc 11 Pec Stretch Doorway   PTRx Ther Proc 11 - Details x30sec   PTRx Ther Proc 12 Standing Docena and Arrow   PTRx Ther Proc 12 - Details x10   Therapeutic Activity   Therapeutic Activities: dynamic activities to improve functional performance minutes (01952) 15   Ther Act 1 lifting mechanics   Ther Act 1 - Details discussed general lifting body mechanics and breathing, as well as moving feet more than twisting, bending knees to   objects, starting a gym routine and making sure pt has good form with all exercises to prevent recurrance of back pain   Skilled Intervention education on body mechanics and being mindful at work   Patient Response/Progress pt verbalized understanding   Education   Learner/Method Patient   Plan   Home program ptrx printout   Plan for next session pt will follow up in 2 weeks if having issues or questions, otherwise d/c   Total Session Time   Timed Code Treatment Minutes 40   Total Treatment Time (sum of timed and untimed services) 40         DISCHARGE  Reason for Discharge: Patient has met all goals.  No further expectation of progress.  Patient chooses to discontinue therapy.    Equipment Issued: HEP     Discharge Plan: Patient to continue home program.    Referring Provider:  Kayla Cuevas

## 2025-01-17 NOTE — PROGRESS NOTES
Assessment & Plan   Back muscle spasm  Will keep the remaining muscle relaxers in case of a slight flare-up.  Recommended continuing to do the exercises his physical therapist taught him. He states he has continued them.  Not to go back to work Monday per his discussion with HR writing.  0956   BMI:   Estimated body mass index is 39.56 kg/m  as calculated from the following:    Height as of this encounter: 1.829 m (6').    Weight as of this encounter: 132.3 kg (291 lb 11.2 oz).   Weight management plan: Discussed healthy diet and exercise guidelines    Will return for a preventative care visit soon.    SCHUYLER HIRSCH [facility]    Pranay Lee is a 47 year old, presenting for the following health issues:  Stopped the pain medicine on Tuesday to see how he would do in PT on yesterday. Last tizanadine. Does his PT stretches every morning. He has some strengthening exercises form the therapist also.    History of Present Illness       Reason for visit:  Workmans comp    He eats 4 or more servings of fruits and vegetables daily.He consumes 0 sweetened beverage(s) daily.He exercises with enough effort to increase his heart rate 30 to 60 minutes per day.  He exercises with enough effort to increase his heart rate 5 days per week.   He is taking medications regularly.       Objective    BP (!) 142/80 (BP Location: Left arm, Patient Position: Sitting, Cuff Size: Adult Regular)   Pulse 80   Temp 97.8  F (36.6  C) (Tympanic)   Resp 16   Ht 1.829 m (6')   Wt 132.3 kg (291 lb 11.2 oz)   SpO2 99%   BMI 39.56 kg/m    Body mass index is 39.56 kg/m .  Physical Exam   GENERAL: healthy, alert and no distress  EYES: Eyes grossly normal to inspection, PERRL and conjunctivae and sclerae normal  RESP: lungs clear to auscultation - no rales, rhonchi or wheezes  MS: no gross musculoskeletal defects noted, no edema  Comprehensive back pain exam:  No tenderness, Range of motion not limited by pain,  Lower extremity strength functional and equal on both sides, Lower extremity reflexes within normal limits bilaterally, Lower extremity sensation normal and equal on both sides, and Straight leg raise negative bilaterally     [Pathological] : TNM Stage: p [IB] : IB [FreeTextEntry4] : Invasive Ductal Carcinoma of the Right Breast, ER+ [TTNM] : 2 [NTNM] : 1a [MTNM] : x

## 2025-05-10 ENCOUNTER — HEALTH MAINTENANCE LETTER (OUTPATIENT)
Age: 50
End: 2025-05-10

## 2025-06-12 ENCOUNTER — OFFICE VISIT (OUTPATIENT)
Dept: ORTHOPEDICS | Facility: CLINIC | Age: 50
End: 2025-06-12
Payer: COMMERCIAL

## 2025-06-12 ENCOUNTER — RESULTS FOLLOW-UP (OUTPATIENT)
Dept: ORTHOPEDICS | Facility: CLINIC | Age: 50
End: 2025-06-12

## 2025-06-12 ENCOUNTER — LAB (OUTPATIENT)
Dept: LAB | Facility: CLINIC | Age: 50
End: 2025-06-12
Payer: COMMERCIAL

## 2025-06-12 VITALS — BODY MASS INDEX: 40.09 KG/M2 | HEIGHT: 72 IN | WEIGHT: 296 LBS

## 2025-06-12 DIAGNOSIS — M10.9 ACUTE GOUT INVOLVING TOE OF RIGHT FOOT, UNSPECIFIED CAUSE: Primary | ICD-10-CM

## 2025-06-12 DIAGNOSIS — M72.2 PLANTAR FASCIITIS: ICD-10-CM

## 2025-06-12 DIAGNOSIS — M10.9 ACUTE GOUT INVOLVING TOE OF RIGHT FOOT, UNSPECIFIED CAUSE: ICD-10-CM

## 2025-06-12 LAB
CRP SERPL-MCNC: 9.98 MG/L
ERYTHROCYTE [SEDIMENTATION RATE] IN BLOOD BY WESTERGREN METHOD: 18 MM/HR (ref 0–20)
URATE SERPL-MCNC: 7.6 MG/DL (ref 3.4–7)

## 2025-06-12 PROCEDURE — 36415 COLL VENOUS BLD VENIPUNCTURE: CPT

## 2025-06-12 PROCEDURE — 86140 C-REACTIVE PROTEIN: CPT

## 2025-06-12 PROCEDURE — 85652 RBC SED RATE AUTOMATED: CPT

## 2025-06-12 PROCEDURE — 84550 ASSAY OF BLOOD/URIC ACID: CPT

## 2025-06-12 RX ORDER — INDOMETHACIN 50 MG/1
50 CAPSULE ORAL
Qty: 30 CAPSULE | Refills: 0 | Status: SHIPPED | OUTPATIENT
Start: 2025-06-12

## 2025-06-12 NOTE — PROGRESS NOTES
ASSESSMENT & PLAN       Today we discussed the underlying etiology/pathology of patient's   1. Acute gout involving 1st MTP joint great toe of right foot, unspecified cause    2. Plantar fasciitis, right        Today a shared decision making model was used. The patient's values and choices were respected. The following information represents what was discussed and decided upon by the provider and the patient.  - We discussed the patient has subacute right foot first MTP joint pain that began 2 weeks ago.  Symptoms have slowly improved.  He states initially the joint was swollen and red and hot.  He denies history of gout or previous similar event affecting his right foot.  - We discussed that this appears to be gout as he has tenderness around the first MTP joint with no history of trauma  - Patient will be sent to [facility] lab for uric acid, CRP and sed rate.  I will contact the patient tomorrow via telephone with results  - Patient be prescribed indomethacin to be taken as directed.  1 pill with food 3 times daily until symptoms abort which is likely to be 7-10 days.  -Secondary concern is some chronic heel pain.  This is consistent with plantar fasciitis.  - Patient has wore a night splint on his left lower extremity previously for heel pain but this is more in the Achilles tendon region.  We discussed that he could utilize this on the right side.  - Patient was provided home exercises today to work on stretching  - We discussed appropriate shoe wear with arch support and avoiding going barefoot, wearing flip-flops, sandals or crocs as these are not supportive shoes while inside.    - Thank you choosing the [facility] orthopedic walk in clinic for your care today.  You should follow-up with a scheduled follow up clinic appointment with either Dr. Yeo, Dr. Lua, Dr. Mead or Dr. Mays in the non-operative sports med department at [facility] for recheck if needed.  You can call  [phone] to make a follow up appointment with one of these providers.       - Appointment line phone number is [[phone]]     Adam Mendoza PA-C  [facility]    This note was completed in part using a voice recognition software, any grammatical or context distortion are unintentional and inherent to the software.         SUBJECTIVE  Jesus Grady is a/an 49 year old male who is seen in the [facility] for evaluation of right foot pain. The patient is seen by themselves.    Onset: 2 week(s) ago. Reports insidious onset without acute precipitating event. Pain, redness,swelling of right 1st MTP joint.  Has modified diet for possible gout with some improvement.  Big toe has gotten better since then until end of the day.  3 weeks ago patient did have increased alcohol and red meat intake  Prior to onset of symptoms of right great toe pain.  Also c/o right heel pain x 2 months.  Worse with walking.  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  Patient states he has been dealing with chronic left heel pain on and off and has a nocturnal night splint that he wears which has been beneficial.  He has not tried that splint on the right side yet.  He states indoors he normally wears flip-flops.  Location of Pain: right foot, big toe-first MTP joint as well as plantar surface medial heel pain  Rating of Pain at worst: 10/10  Rating of Pain Currently: 4/10  Worsened by: bumping, touching, and end of the day  Better with: rest and elevation  Treatments tried: rest/activity avoidance, elevation, heat, and modified diet for gout  Quality: throbbing, sharp  Associated symptoms: swelling, tingling, warmth, and redness  Orthopedic history: NO  Relevant surgical history: NO  Social history: social history: works as a .  Right handed.  Loves to ride his motorcycle.    Past Medical History:   Diagnosis Date     Carpal tunnel syndrome 3/04    Right    Diverticulitis of colon (without mention of hemorrhage)(562.11) 9/04     Social History     Socioeconomic History    Marital status:     Number of children: 1   Occupational History     Employer: [employer]   Tobacco Use    Smoking status: Never    Smokeless tobacco: Never    Tobacco comments:     grew up in a family of smokers   Substance and Sexual Activity    Alcohol use: Yes     Comment: occ, about 6 beers weekly    Drug use: No    Sexual activity: Yes     Partners: Female     Birth control/protection: Condom   Other Topics Concern    Parent/sibling w/ CABG, MI or angioplasty before 65F 55M? Yes     Social Drivers of Health     Financial Resource Strain: Unknown (10/23/2023)    Financial Resource Strain     Within the past 12 months, have you or your family members you live with been unable to get utilities (heat, electricity) when it was really needed?: Patient refused   Food Insecurity: Unknown (10/23/2023)    Food Insecurity     Within the past 12 months, did you worry that your food would run out before you got money to buy more?: Patient refused     Within the past 12 months, did the food you bought just not last and you didn t have money to get more?: Patient refused   Transportation Needs: Unknown (10/23/2023)    Transportation Needs     Within the past 12 months, has lack of transportation kept you from medical appointments, getting your medicines, non-medical meetings or appointments, work, or from getting things that you need?: Patient refused   Interpersonal Safety: Low Risk  (10/23/2023)    Interpersonal Safety     Do you feel physically and emotionally safe where you currently live?: Yes     Within the past 12 months, have you been hit, slapped, kicked or otherwise physically hurt by someone?: No     Within the past 12 months, have you been humiliated or emotionally abused in other ways by your partner or ex-partner?: No    Housing Stability: Unknown (10/23/2023)    Housing Stability     Do you have housing? : Patient refused     Are you worried about losing your housing?: Patient refused         Patient's past medical, surgical, social, and family histories were personally reviewed today and no changes are noted.    REVIEW OF SYSTEMS:  10 point ROS is negative other than symptoms noted above in HPI, Past Medical History or as stated below  Constitutional: NEGATIVE for fever, chills, change in weight  Skin: NEGATIVE for worrisome rashes, moles or lesions  GI/: NEGATIVE for bowel or bladder changes  Neuro: NEGATIVE for weakness, dizziness or paresthesias    OBJECTIVE:  Vital signs as noted in EPIC for 6/12/2025  General: healthy, alert and in no distress  HEENT: no scleral icterus or conjunctival erythema  Skin: no suspicious lesions or rash. No jaundice.  CV: no pedal edema  Resp: normal respiratory effort without conversational dyspnea   Psych: normal mood and affect        MSK:  Exam shows a well-nourished 49-year-old male who ambulates full weightbearing without assistive device wearing dirt covered at work boots which are removed.  Examination of the right foot shows no obvious erythema.  Slight swelling of the first MTP joint of the great toe.  Patient's toes themselves are nontender but he has pain on the medial and plantar side of the first MTP joint with pain with passive and active range of motion of this joint.  Remaining toes, midfoot are nontender.  Patient has point tender over the medial calcaneal tubercle at the insertion of the plantar fascia with increased pain with passive stretch.  Patient has adequate arch.  Patient neurovascular intact.  No peripheral edema.  Patient has intact flexor and extensor tendons of the toes with again pain with motion of the first MTP joint/great toe.        Patient's conditions were thoroughly discussed during today's clinical visit with total time reviewing patient's previous  medical records/history/radiology, face-to-face examination and discussion and plan of care with the patient and documentation being 35 minutes on today's clinical visit  Adam Mendoza PA-C  [facility]    This note was completed in part using a voice recognition software, any grammatical or context distortion are unintentional and inherent to the software.

## 2025-06-12 NOTE — LETTER
6/12/2025      Jesus Grady  [address] [place] [id number]      Dear Colleague,    Thank you for referring your patient, Jesus Grady, to the [facility]TS MEDICINE [facility]INIC [place]. Please see a copy of my visit note below.    ASSESSMENT & PLAN       Today we discussed the underlying etiology/pathology of patient's   1. Acute gout involving 1st MTP joint great toe of right foot, unspecified cause    2. Plantar fasciitis, right        Today a shared decision making model was used. The patient's values and choices were respected. The following information represents what was discussed and decided upon by the provider and the patient.  - We discussed the patient has subacute right foot first MTP joint pain that began 2 weeks ago.  Symptoms have slowly improved.  He states initially the joint was swollen and red and hot.  He denies history of gout or previous similar event affecting his right foot.  - We discussed that this appears to be gout as he has tenderness around the first MTP joint with no history of trauma  - Patient will be sent to [facility] lab for uric acid, CRP and sed rate.  I will contact the patient tomorrow via telephone with results  - Patient be prescribed indomethacin to be taken as directed.  1 pill with food 3 times daily until symptoms abort which is likely to be 7-10 days.  -Secondary concern is some chronic heel pain.  This is consistent with plantar fasciitis.  - Patient has wore a night splint on his left lower extremity previously for heel pain but this is more in the Achilles tendon region.  We discussed that he could utilize this on the right side.  - Patient was provided home exercises today to work on stretching  - We discussed appropriate shoe wear with arch support and avoiding going barefoot, wearing flip-flops, sandals or crocs as these are not supportive shoes while inside.    - Thank you choosing the [facility] orthopedic  walk in clinic for your care today.  You should follow-up with a scheduled follow up clinic appointment with either Dr. Yeo, Dr. Lua, Dr. Mead or Dr. Mays in the non-operative sports med department at [facility] for recheck if needed.  You can call [phone] to make a follow up appointment with one of these providers.       - Appointment line phone number is [[phone]]     Adam Mendoza PA-C  [facility]    This note was completed in part using a voice recognition software, any grammatical or context distortion are unintentional and inherent to the software.         SUBJECTIVE  Jesus Grady is a/an 49 year old male who is seen in the [facility] clinic for evaluation of right foot pain. The patient is seen by themselves.    Onset: 2 week(s) ago. Reports insidious onset without acute precipitating event. Pain, redness,swelling of right 1st MTP joint.  Has modified diet for possible gout with some improvement.  Big toe has gotten better since then until end of the day.  3 weeks ago patient did have increased alcohol and red meat intake  Prior to onset of symptoms of right great toe pain.  Also c/o right heel pain x 2 months.  Worse with walking.  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  Patient states he has been dealing with chronic left heel pain on and off and has a nocturnal night splint that he wears which has been beneficial.  He has not tried that splint on the right side yet.  He states indoors he normally wears flip-flops.  Location of Pain: right foot, big toe-first MTP joint as well as plantar surface medial heel pain  Rating of Pain at worst: 10/10  Rating of Pain Currently: 4/10  Worsened by: bumping, touching, and end of the day  Better with: rest and elevation  Treatments tried: rest/activity avoidance, elevation, heat, and modified diet for gout  Quality: throbbing,  sharp  Associated symptoms: swelling, tingling, warmth, and redness  Orthopedic history: NO  Relevant surgical history: NO  Social history: social history: works as a .  Right handed.  Loves to ride his motorcycle.    Past Medical History:   Diagnosis Date     Carpal tunnel syndrome 3/04    Right     Diverticulitis of colon (without mention of hemorrhage)(562.11) 9/04     Social History     Socioeconomic History     Marital status:      Number of children: 1   Occupational History     Employer: [employer]   Tobacco Use     Smoking status: Never     Smokeless tobacco: Never     Tobacco comments:     grew up in a family of smokers   Substance and Sexual Activity     Alcohol use: Yes     Comment: occ, about 6 beers weekly     Drug use: No     Sexual activity: Yes     Partners: Female     Birth control/protection: Condom   Other Topics Concern     Parent/sibling w/ CABG, MI or angioplasty before 65F 55M? Yes     Social Drivers of Health     Financial Resource Strain: Unknown (10/23/2023)    Financial Resource Strain      Within the past 12 months, have you or your family members you live with been unable to get utilities (heat, electricity) when it was really needed?: Patient refused   Food Insecurity: Unknown (10/23/2023)    Food Insecurity      Within the past 12 months, did you worry that your food would run out before you got money to buy more?: Patient refused      Within the past 12 months, did the food you bought just not last and you didn t have money to get more?: Patient refused   Transportation Needs: Unknown (10/23/2023)    Transportation Needs      Within the past 12 months, has lack of transportation kept you from medical appointments, getting your medicines, non-medical meetings or appointments, work, or from getting things that you need?: Patient refused   Interpersonal Safety: Low Risk  (10/23/2023)    Interpersonal Safety      Do you feel physically and  emotionally safe where you currently live?: Yes      Within the past 12 months, have you been hit, slapped, kicked or otherwise physically hurt by someone?: No      Within the past 12 months, have you been humiliated or emotionally abused in other ways by your partner or ex-partner?: No   Housing Stability: Unknown (10/23/2023)    Housing Stability      Do you have housing? : Patient refused      Are you worried about losing your housing?: Patient refused         Patient's past medical, surgical, social, and family histories were personally reviewed today and no changes are noted.    REVIEW OF SYSTEMS:  10 point ROS is negative other than symptoms noted above in HPI, Past Medical History or as stated below  Constitutional: NEGATIVE for fever, chills, change in weight  Skin: NEGATIVE for worrisome rashes, moles or lesions  GI/: NEGATIVE for bowel or bladder changes  Neuro: NEGATIVE for weakness, dizziness or paresthesias    OBJECTIVE:  Vital signs as noted in EPIC for 6/12/2025  General: healthy, alert and in no distress  HEENT: no scleral icterus or conjunctival erythema  Skin: no suspicious lesions or rash. No jaundice.  CV: no pedal edema  Resp: normal respiratory effort without conversational dyspnea   Psych: normal mood and affect        MSK:  Exam shows a well-nourished 49-year-old male who ambulates full weightbearing without assistive device wearing dirt covered at work boots which are removed.  Examination of the right foot shows no obvious erythema.  Slight swelling of the first MTP joint of the great toe.  Patient's toes themselves are nontender but he has pain on the medial and plantar side of the first MTP joint with pain with passive and active range of motion of this joint.  Remaining toes, midfoot are nontender.  Patient has point tender over the medial calcaneal tubercle at the insertion of the plantar fascia with increased pain with passive stretch.  Patient has adequate arch.  Patient  neurovascular intact.  No peripheral edema.  Patient has intact flexor and extensor tendons of the toes with again pain with motion of the first MTP joint/great toe.        Patient's conditions were thoroughly discussed during today's clinical visit with total time reviewing patient's previous medical records/history/radiology, face-to-face examination and discussion and plan of care with the patient and documentation being 35 minutes on today's clinical visit  Adam Mendoza PA-C  [facility]    This note was completed in part using a voice recognition software, any grammatical or context distortion are unintentional and inherent to the software.     Again, thank you for allowing me to participate in the care of your patient.        Sincerely,        Adam Mendoza PA-C    Electronically signed

## 2025-06-12 NOTE — PATIENT INSTRUCTIONS
Thank you for allowing me to be part of your care team. My personal goal for your visit today is that you felt that I listened to you, you understood your diagnosis and treatment options and our staff/clinic met your expectations. We strive to provide you excellent care.  If you felt like your expectations were not met at your visit today or if you have further questions about your visit or care, please send me a viviot message and I would be happy answer any questions or listen to your feedback.  If you do not have MyChart, you can call [phone] to speak with me.      If advanced imaging (MRI, CT scan) or blood work was ordered at your appointment today, I will contact you as we discussed today either by telephone call or [email or web address] message within 48 hours after your advanced imaging testing has been completed or when ALL your lab results are available to review/discuss with you.       Today we discussed the underlying etiology/pathology of patient's   1. Acute gout involving 1st MTP joint great toe of right foot, unspecified cause    2. Plantar fasciitis, right        Today a shared decision making model was used. The patient's values and choices were respected. The following information represents what was discussed and decided upon by the provider and the patient.  - We discussed the patient has subacute right foot first MTP joint pain that began 2 weeks ago.  Symptoms have slowly improved.  He states initially the joint was swollen and red and hot.  He denies history of gout or previous similar event affecting his right foot.  - We discussed that this appears to be gout as he has tenderness around the first MTP joint with no history of trauma  - Patient will be sent to [facility] lab for uric acid, CRP and sed rate.  I will contact the patient tomorrow via telephone with results  - Patient be prescribed indomethacin to be taken as directed.  1 pill with food 3 times daily until symptoms abort which is  likely to be 7-10 days.  -Secondary concern is some chronic heel pain.  This is consistent with plantar fasciitis.  - Patient has wore a night splint on his left lower extremity previously for heel pain but this is more in the Achilles tendon region.  We discussed that he could utilize this on the right side.  - Patient was provided home exercises today to work on stretching  - We discussed appropriate shoe wear with arch support and avoiding going barefoot, wearing flip-flops, sandals or crocs as these are not supportive shoes while inside.    - Thank you choosing the [facility] orthopedic walk in clinic for your care today.  You should follow-up with a scheduled follow up clinic appointment with either Dr. Yeo, Dr. Lua, Dr. Mead or Dr. Mays in the non-operative sports med department at [place] for recheck if needed.  You can call [phone] to make a follow up appointment with one of these providers.       - Appointment line phone number is [[phone]]     Adam Mendoza PA-C  [facility]    This note was completed in part using a voice recognition software, any grammatical or context distortion are unintentional and inherent to the software.

## 2025-06-12 NOTE — TELEPHONE ENCOUNTER
Labs discussed with patient.  Urid acid mildly elevated as well as CRP.  Sed rate normal.   Symptoms and exam with labs indicate resolving gout flare. Patient will taken indomethocin as directed and follow up with scheduled appt with sports med provider if not better in 2 weeks.   Patient to follow treatment as discussed for plantar fasciitis which will take longer than 2 weeks to improve and he needs to follow treatment plan with good shoes, stretching program and night splint.  Adam Mendoza PA-C

## 2025-07-01 ENCOUNTER — ANCILLARY PROCEDURE (OUTPATIENT)
Dept: GENERAL RADIOLOGY | Facility: CLINIC | Age: 50
End: 2025-07-01
Attending: STUDENT IN AN ORGANIZED HEALTH CARE EDUCATION/TRAINING PROGRAM
Payer: COMMERCIAL

## 2025-07-01 ENCOUNTER — OFFICE VISIT (OUTPATIENT)
Dept: ORTHOPEDICS | Facility: CLINIC | Age: 50
End: 2025-07-01
Payer: COMMERCIAL

## 2025-07-01 DIAGNOSIS — M79.671 RIGHT FOOT PAIN: ICD-10-CM

## 2025-07-01 DIAGNOSIS — M10.9 ACUTE GOUT INVOLVING TOE OF RIGHT FOOT, UNSPECIFIED CAUSE: ICD-10-CM

## 2025-07-01 DIAGNOSIS — M79.671 RIGHT FOOT PAIN: Primary | ICD-10-CM

## 2025-07-01 PROCEDURE — 99204 OFFICE O/P NEW MOD 45 MIN: CPT | Performed by: STUDENT IN AN ORGANIZED HEALTH CARE EDUCATION/TRAINING PROGRAM

## 2025-07-01 PROCEDURE — 73630 X-RAY EXAM OF FOOT: CPT | Mod: TC | Performed by: RADIOLOGY

## 2025-07-01 RX ORDER — PREDNISONE 20 MG/1
40 TABLET ORAL DAILY
Qty: 10 TABLET | Refills: 0 | Status: SHIPPED | OUTPATIENT
Start: 2025-07-01

## 2025-07-01 NOTE — PROGRESS NOTES
ASSESSMENT & PLAN    Jesus was seen today for pain.    Diagnoses and all orders for this visit:    Right foot pain  -     XR Foot RT G/E 3 vw; Future  -     predniSONE (DELTASONE) 20 MG tablet; Take 2 tablets (40 mg) by mouth daily.    Acute gout involving 1st MTP joint great toe of right foot, unspecified cause  -     predniSONE (DELTASONE) 20 MG tablet; Take 2 tablets (40 mg) by mouth daily.      This issue is acute and Improving. Jesus presents our clinic today to follow-up on his right toe pain.  He reports since seeing our clinic overall improvement in his pain in the big toe, however he continues to have some pain mostly with ambulation.  Radiographs taken in clinic today were reviewed with the patient and were unremarkable for any significant degenerative changes at the MCP joint of the big toe as well as no evidence of erosions on examination, he is tender to palpation over the MCP joint but otherwise has minimal swelling and erythema.  We discussed with the patient that given he continues to have pain, we could trial a stronger anti-inflammatory medicine to resolve what appears to be lingering gout flare.  We discussed following up with his primary care doctor for further discussion of uric acid lowering medications.  We determined the following plan:  - Prednisone 40 mg for 5 days sent to pharmacy  - Patient will follow-up with his PCP for further discussion of uric acid lowering medications  - He can follow-up with us should his symptoms fail to improve or worsen, or he suffers another acute gout flare to trial aspiration for formal evaluation of Gout    Forrest Lua [facility] [place]    -----  Chief Complaint   Patient presents with    Right Foot - Pain       SUBJECTIVE  Jesus Grady is a/an 49 year old male who is seen as a self referral for evaluation of right foot. Patient was evaluated by Adam Mendoza PA-C on 6/12/25.    The patient is seen by  themselves.      Onset: 1 month(s) ago. Reports insidious onset without acute precipitating event.  Location of Pain: right  foot big toe   Worsened by: walking  Better with: rest  Treatments tried: rest/activity avoidance, Tylenol, ibuprofen, and other medications: indomethacin, change in diet  Associated symptoms: swelling, tingling, and redness    Orthopedic/Surgical history: NO  Social History/Occupation:       REVIEW OF SYSTEMS:  Review of systems negative unless mentioned in HPI     OBJECTIVE:  There were no vitals taken for this visit.   General: healthy, alert and in no distress  Skin: no suspicious lesions or rash.  CV: distal perfusion intact   Resp: normal respiratory effort without conversational dyspnea   Psych: normal mood and affect  Gait: NORMAL  Neuro: Normal light sensory exam of RL extremity     Right Foot and Ankle exam  Gait: Normal  Alignment: Normal  Inspection: [x] Normal  [] Swelling present  [] Ecchymosis present []Other   Tenderness: TTP MCP Joint of big toe. No other bony tenderness of forefoot, midfoot, hindfoot noted. No tenderness over either malleolus.   Range of motion (ankle):   Plantarflexion:Full Dorsiflexion: Full  Inversion: Full  Eversion: Full  Strength:   Plantarflexion: 5/5 Dorsiflexion: 5/5  Internal rotation: 5/5 External rotation 5/5  Neurologic: Normal sensation   Vascular: Normal pulses   Special tests:   Aden: Negative  Syndesmotic squeeze test: Negative  Anterior drawer: Negative  Dorsiflexion/eversion: No pain   Talar tilt: Negative    Calcaneal squeeze: Negative    Other tests: None  Contralateral foot and ankle grossly normal in terms of appearance, range of motion, and strength       RADIOLOGY:  Final results and radiologist's interpretation, available in the [facility] health record.  Images were reviewed with the patient in the office today.  My personal interpretation of the performed imaging: Mild scattered degenerative changes throughout the IP joint  of the foot, otherwise normal joint spacing and alignment.  No significant degenerative changes at the MCP joint of the big toe.  Plantar enthesophytosis.  No acute fractures or bony abnormalities.

## 2025-07-01 NOTE — LETTER
7/1/2025      Jesus Grady  [address] [place] [id number]      Dear Colleague,    Thank you for referring your patient, Jesus Grady, to the [facility]. Please see a copy of my visit note below.    ASSESSMENT & PLAN    Jesus was seen today for pain.    Diagnoses and all orders for this visit:    Right foot pain  -     XR Foot RT G/E 3 vw; Future  -     predniSONE (DELTASONE) 20 MG tablet; Take 2 tablets (40 mg) by mouth daily.    Acute gout involving 1st MTP joint great toe of right foot, unspecified cause  -     predniSONE (DELTASONE) 20 MG tablet; Take 2 tablets (40 mg) by mouth daily.      This issue is acute and Improving. Jesus presents our clinic today to follow-up on his right toe pain.  He reports since seeing our clinic overall improvement in his pain in the big toe, however he continues to have some pain mostly with ambulation.  Radiographs taken in clinic today were reviewed with the patient and were unremarkable for any significant degenerative changes at the MCP joint of the big toe as well as no evidence of erosions on examination, he is tender to palpation over the MCP joint but otherwise has minimal swelling and erythema.  We discussed with the patient that given he continues to have pain, we could trial a stronger anti-inflammatory medicine to resolve what appears to be lingering gout flare.  We discussed following up with his primary care doctor for further discussion of uric acid lowering medications.  We determined the following plan:  - Prednisone 40 mg for 5 days sent to pharmacy  - Patient will follow-up with his PCP for further discussion of uric acid lowering medications  - He can follow-up with us should his symptoms fail to improve or worsen, or he suffers another acute gout flare to trial aspiration for formal evaluation of Gout    Forrest Lua,   [facility]    -----  Chief Complaint    Patient presents with     Right Foot - Pain       SUBJECTIVE  Jesus Grady is a/an 49 year old male who is seen as a self referral for evaluation of right foot. Patient was evaluated by Adam Mendoza PA-C on 6/12/25.    The patient is seen by themselves.      Onset: 1 month(s) ago. Reports insidious onset without acute precipitating event.  Location of Pain: right  foot big toe   Worsened by: walking  Better with: rest  Treatments tried: rest/activity avoidance, Tylenol, ibuprofen, and other medications: indomethacin, change in diet  Associated symptoms: swelling, tingling, and redness    Orthopedic/Surgical history: NO  Social History/Occupation:       REVIEW OF SYSTEMS:  Review of systems negative unless mentioned in HPI     OBJECTIVE:  There were no vitals taken for this visit.   General: healthy, alert and in no distress  Skin: no suspicious lesions or rash.  CV: distal perfusion intact   Resp: normal respiratory effort without conversational dyspnea   Psych: normal mood and affect  Gait: NORMAL  Neuro: Normal light sensory exam of RL extremity     Right Foot and Ankle exam  Gait: Normal  Alignment: Normal  Inspection: [x] Normal  [] Swelling present  [] Ecchymosis present []Other   Tenderness: TTP MCP Joint of big toe. No other bony tenderness of forefoot, midfoot, hindfoot noted. No tenderness over either malleolus.   Range of motion (ankle):   Plantarflexion:Full Dorsiflexion: Full  Inversion: Full  Eversion: Full  Strength:   Plantarflexion: 5/5 Dorsiflexion: 5/5  Internal rotation: 5/5 External rotation 5/5  Neurologic: Normal sensation   Vascular: Normal pulses   Special tests:   Aden: Negative  Syndesmotic squeeze test: Negative  Anterior drawer: Negative  Dorsiflexion/eversion: No pain   Talar tilt: Negative    Calcaneal squeeze: Negative    Other tests: None  Contralateral foot and ankle grossly normal in terms of appearance, range of motion, and strength       RADIOLOGY:  Final results and  radiologist's interpretation, available in the [facility] health record.  Images were reviewed with the patient in the office today.  My personal interpretation of the performed imaging: Mild scattered degenerative changes throughout the IP joint of the foot, otherwise normal joint spacing and alignment.  No significant degenerative changes at the MCP joint of the big toe.  Plantar enthesophytosis.  No acute fractures or bony abnormalities.          Again, thank you for allowing me to participate in the care of your patient.        Sincerely,        Forrest Lua, DO    Electronically signed